# Patient Record
Sex: MALE | Race: OTHER | NOT HISPANIC OR LATINO | Employment: OTHER | URBAN - NONMETROPOLITAN AREA
[De-identification: names, ages, dates, MRNs, and addresses within clinical notes are randomized per-mention and may not be internally consistent; named-entity substitution may affect disease eponyms.]

---

## 2019-09-16 ENCOUNTER — TELEPHONE (OUTPATIENT)
Dept: CARDIOLOGY | Facility: PHYSICIAN GROUP | Age: 84
End: 2019-09-16

## 2019-09-16 NOTE — TELEPHONE ENCOUNTER
Padmini Killian at [facility] called and said they have no recent testing for the patient. She said last labs patient had done at their hospital was in 2016, and there are no cardiac records.

## 2019-09-16 NOTE — TELEPHONE ENCOUNTER
Left message for patient for recent labs and cardiac testing, us carotid scanned in. Also faxed request to [facility].

## 2020-03-08 ENCOUNTER — ANESTHESIA (OUTPATIENT)
Dept: SURGERY | Facility: MEDICAL CENTER | Age: 85
DRG: 663 | End: 2020-03-08
Payer: MEDICARE

## 2020-03-08 ENCOUNTER — HOSPITAL ENCOUNTER (OUTPATIENT)
Facility: MEDICAL CENTER | Age: 85
DRG: 663 | End: 2020-03-08
Payer: MEDICARE

## 2020-03-08 ENCOUNTER — HOSPITAL ENCOUNTER (INPATIENT)
Facility: MEDICAL CENTER | Age: 85
LOS: 5 days | DRG: 663 | End: 2020-03-13
Attending: FAMILY MEDICINE | Admitting: FAMILY MEDICINE
Payer: MEDICARE

## 2020-03-08 ENCOUNTER — ANESTHESIA EVENT (OUTPATIENT)
Dept: SURGERY | Facility: MEDICAL CENTER | Age: 85
DRG: 663 | End: 2020-03-08
Payer: MEDICARE

## 2020-03-08 DIAGNOSIS — I35.0 AORTIC STENOSIS: ICD-10-CM

## 2020-03-08 PROBLEM — D62 ACUTE BLOOD LOSS ANEMIA: Status: ACTIVE | Noted: 2020-03-08

## 2020-03-08 PROBLEM — R31.9 HEMATURIA: Status: ACTIVE | Noted: 2020-03-08

## 2020-03-08 LAB
ABO GROUP BLD: NORMAL
ANION GAP SERPL CALC-SCNC: 8 MMOL/L (ref 0–11.9)
BARCODED ABORH UBTYP: 6200
BARCODED ABORH UBTYP: 6200
BARCODED PRD CODE UBPRD: NORMAL
BARCODED PRD CODE UBPRD: NORMAL
BARCODED UNIT NUM UBUNT: NORMAL
BARCODED UNIT NUM UBUNT: NORMAL
BLD GP AB SCN SERPL QL: NORMAL
BUN SERPL-MCNC: 41 MG/DL (ref 8–22)
CALCIUM SERPL-MCNC: 7.8 MG/DL (ref 8.5–10.5)
CHLORIDE SERPL-SCNC: 108 MMOL/L (ref 96–112)
CO2 SERPL-SCNC: 19 MMOL/L (ref 20–33)
COMPONENT R 8504R: NORMAL
COMPONENT R 8504R: NORMAL
CREAT SERPL-MCNC: 1.07 MG/DL (ref 0.5–1.4)
EKG IMPRESSION: NORMAL
ERYTHROCYTE [DISTWIDTH] IN BLOOD BY AUTOMATED COUNT: 55.5 FL (ref 35.9–50)
GLUCOSE SERPL-MCNC: 116 MG/DL (ref 65–99)
HCT VFR BLD AUTO: 23.6 % (ref 42–52)
HGB BLD-MCNC: 7.9 G/DL (ref 14–18)
INR PPP: 1.14 (ref 0.87–1.13)
MCH RBC QN AUTO: 31.1 PG (ref 27–33)
MCHC RBC AUTO-ENTMCNC: 33.5 G/DL (ref 33.7–35.3)
MCV RBC AUTO: 92.9 FL (ref 81.4–97.8)
PLATELET # BLD AUTO: 171 K/UL (ref 164–446)
PMV BLD AUTO: 10.6 FL (ref 9–12.9)
POTASSIUM SERPL-SCNC: 4.2 MMOL/L (ref 3.6–5.5)
PRODUCT TYPE UPROD: NORMAL
PRODUCT TYPE UPROD: NORMAL
PROTHROMBIN TIME: 14.9 SEC (ref 12–14.6)
RBC # BLD AUTO: 2.54 M/UL (ref 4.7–6.1)
RH BLD: NORMAL
SODIUM SERPL-SCNC: 135 MMOL/L (ref 135–145)
UNIT STATUS USTAT: NORMAL
UNIT STATUS USTAT: NORMAL
WBC # BLD AUTO: 7.4 K/UL (ref 4.8–10.8)

## 2020-03-08 PROCEDURE — 86901 BLOOD TYPING SEROLOGIC RH(D): CPT

## 2020-03-08 PROCEDURE — 30233N1 TRANSFUSION OF NONAUTOLOGOUS RED BLOOD CELLS INTO PERIPHERAL VEIN, PERCUTANEOUS APPROACH: ICD-10-PCS | Performed by: UROLOGY

## 2020-03-08 PROCEDURE — A4338 INDWELLING CATHETER LATEX: HCPCS | Performed by: UROLOGY

## 2020-03-08 PROCEDURE — 160028 HCHG SURGERY MINUTES - 1ST 30 MINS LEVEL 3: Performed by: UROLOGY

## 2020-03-08 PROCEDURE — 36415 COLL VENOUS BLD VENIPUNCTURE: CPT

## 2020-03-08 PROCEDURE — 93010 ELECTROCARDIOGRAM REPORT: CPT | Performed by: INTERNAL MEDICINE

## 2020-03-08 PROCEDURE — 501329 HCHG SET, CYSTO IRRIG Y TUR: Performed by: UROLOGY

## 2020-03-08 PROCEDURE — 160035 HCHG PACU - 1ST 60 MINS PHASE I: Performed by: UROLOGY

## 2020-03-08 PROCEDURE — 99223 1ST HOSP IP/OBS HIGH 75: CPT | Mod: AI,25 | Performed by: INTERNAL MEDICINE

## 2020-03-08 PROCEDURE — 160048 HCHG OR STATISTICAL LEVEL 1-5: Performed by: UROLOGY

## 2020-03-08 PROCEDURE — 700101 HCHG RX REV CODE 250: Performed by: ANESTHESIOLOGY

## 2020-03-08 PROCEDURE — 0TCB8ZZ EXTIRPATION OF MATTER FROM BLADDER, VIA NATURAL OR ARTIFICIAL OPENING ENDOSCOPIC: ICD-10-PCS | Performed by: UROLOGY

## 2020-03-08 PROCEDURE — 85027 COMPLETE CBC AUTOMATED: CPT

## 2020-03-08 PROCEDURE — 700102 HCHG RX REV CODE 250 W/ 637 OVERRIDE(OP): Performed by: INTERNAL MEDICINE

## 2020-03-08 PROCEDURE — 85610 PROTHROMBIN TIME: CPT

## 2020-03-08 PROCEDURE — 700105 HCHG RX REV CODE 258: Performed by: INTERNAL MEDICINE

## 2020-03-08 PROCEDURE — A9270 NON-COVERED ITEM OR SERVICE: HCPCS | Performed by: INTERNAL MEDICINE

## 2020-03-08 PROCEDURE — 86900 BLOOD TYPING SEROLOGIC ABO: CPT

## 2020-03-08 PROCEDURE — 700111 HCHG RX REV CODE 636 W/ 250 OVERRIDE (IP): Performed by: ANESTHESIOLOGY

## 2020-03-08 PROCEDURE — 770020 HCHG ROOM/CARE - TELE (206)

## 2020-03-08 PROCEDURE — 80048 BASIC METABOLIC PNL TOTAL CA: CPT

## 2020-03-08 PROCEDURE — 86850 RBC ANTIBODY SCREEN: CPT

## 2020-03-08 PROCEDURE — 160009 HCHG ANES TIME/MIN: Performed by: UROLOGY

## 2020-03-08 PROCEDURE — 160002 HCHG RECOVERY MINUTES (STAT): Performed by: UROLOGY

## 2020-03-08 PROCEDURE — 99358 PROLONG SERVICE W/O CONTACT: CPT | Performed by: INTERNAL MEDICINE

## 2020-03-08 PROCEDURE — 0W3R8ZZ CONTROL BLEEDING IN GENITOURINARY TRACT, VIA NATURAL OR ARTIFICIAL OPENING ENDOSCOPIC: ICD-10-PCS | Performed by: UROLOGY

## 2020-03-08 PROCEDURE — 160039 HCHG SURGERY MINUTES - EA ADDL 1 MIN LEVEL 3: Performed by: UROLOGY

## 2020-03-08 PROCEDURE — 160036 HCHG PACU - EA ADDL 30 MINS PHASE I: Performed by: UROLOGY

## 2020-03-08 PROCEDURE — 700105 HCHG RX REV CODE 258: Performed by: ANESTHESIOLOGY

## 2020-03-08 PROCEDURE — 93005 ELECTROCARDIOGRAM TRACING: CPT | Performed by: FAMILY MEDICINE

## 2020-03-08 RX ORDER — HYDRALAZINE HYDROCHLORIDE 20 MG/ML
5 INJECTION INTRAMUSCULAR; INTRAVENOUS
Status: DISCONTINUED | OUTPATIENT
Start: 2020-03-08 | End: 2020-03-08 | Stop reason: HOSPADM

## 2020-03-08 RX ORDER — OXYCODONE HCL 5 MG/5 ML
10 SOLUTION, ORAL ORAL
Status: DISCONTINUED | OUTPATIENT
Start: 2020-03-08 | End: 2020-03-08 | Stop reason: HOSPADM

## 2020-03-08 RX ORDER — SODIUM CHLORIDE, SODIUM LACTATE, POTASSIUM CHLORIDE, CALCIUM CHLORIDE 600; 310; 30; 20 MG/100ML; MG/100ML; MG/100ML; MG/100ML
INJECTION, SOLUTION INTRAVENOUS CONTINUOUS
Status: DISCONTINUED | OUTPATIENT
Start: 2020-03-08 | End: 2020-03-08 | Stop reason: HOSPADM

## 2020-03-08 RX ORDER — CEFAZOLIN SODIUM 1 G/3ML
INJECTION, POWDER, FOR SOLUTION INTRAMUSCULAR; INTRAVENOUS PRN
Status: DISCONTINUED | OUTPATIENT
Start: 2020-03-08 | End: 2020-03-08 | Stop reason: SURG

## 2020-03-08 RX ORDER — LABETALOL HYDROCHLORIDE 5 MG/ML
5 INJECTION, SOLUTION INTRAVENOUS
Status: DISCONTINUED | OUTPATIENT
Start: 2020-03-08 | End: 2020-03-08 | Stop reason: HOSPADM

## 2020-03-08 RX ORDER — CARVEDILOL 25 MG/1
25 TABLET ORAL 2 TIMES DAILY WITH MEALS
Status: DISCONTINUED | OUTPATIENT
Start: 2020-03-08 | End: 2020-03-09

## 2020-03-08 RX ORDER — POLYETHYLENE GLYCOL 3350 17 G/17G
1 POWDER, FOR SOLUTION ORAL
Status: DISCONTINUED | OUTPATIENT
Start: 2020-03-08 | End: 2020-03-13 | Stop reason: HOSPADM

## 2020-03-08 RX ORDER — ONDANSETRON 2 MG/ML
4 INJECTION INTRAMUSCULAR; INTRAVENOUS EVERY 4 HOURS PRN
Status: DISCONTINUED | OUTPATIENT
Start: 2020-03-08 | End: 2020-03-13 | Stop reason: HOSPADM

## 2020-03-08 RX ORDER — DIPHENHYDRAMINE HYDROCHLORIDE 50 MG/ML
12.5 INJECTION INTRAMUSCULAR; INTRAVENOUS
Status: DISCONTINUED | OUTPATIENT
Start: 2020-03-08 | End: 2020-03-08 | Stop reason: HOSPADM

## 2020-03-08 RX ORDER — FINASTERIDE 5 MG/1
5 TABLET, FILM COATED ORAL DAILY
Status: DISCONTINUED | OUTPATIENT
Start: 2020-03-09 | End: 2020-03-13 | Stop reason: HOSPADM

## 2020-03-08 RX ORDER — ONDANSETRON 2 MG/ML
INJECTION INTRAMUSCULAR; INTRAVENOUS PRN
Status: DISCONTINUED | OUTPATIENT
Start: 2020-03-08 | End: 2020-03-08 | Stop reason: SURG

## 2020-03-08 RX ORDER — OXYCODONE HCL 5 MG/5 ML
5 SOLUTION, ORAL ORAL
Status: DISCONTINUED | OUTPATIENT
Start: 2020-03-08 | End: 2020-03-08 | Stop reason: HOSPADM

## 2020-03-08 RX ORDER — PHENYLEPHRINE HCL IN 0.9% NACL 0.5 MG/5ML
SYRINGE (ML) INTRAVENOUS PRN
Status: DISCONTINUED | OUTPATIENT
Start: 2020-03-08 | End: 2020-03-08 | Stop reason: SURG

## 2020-03-08 RX ORDER — ONDANSETRON 2 MG/ML
4 INJECTION INTRAMUSCULAR; INTRAVENOUS
Status: DISCONTINUED | OUTPATIENT
Start: 2020-03-08 | End: 2020-03-08 | Stop reason: HOSPADM

## 2020-03-08 RX ORDER — HALOPERIDOL 5 MG/ML
1 INJECTION INTRAMUSCULAR
Status: DISCONTINUED | OUTPATIENT
Start: 2020-03-08 | End: 2020-03-08 | Stop reason: HOSPADM

## 2020-03-08 RX ORDER — SODIUM CHLORIDE, SODIUM LACTATE, POTASSIUM CHLORIDE, CALCIUM CHLORIDE 600; 310; 30; 20 MG/100ML; MG/100ML; MG/100ML; MG/100ML
INJECTION, SOLUTION INTRAVENOUS
Status: DISCONTINUED | OUTPATIENT
Start: 2020-03-08 | End: 2020-03-08 | Stop reason: SURG

## 2020-03-08 RX ORDER — ONDANSETRON 4 MG/1
4 TABLET, ORALLY DISINTEGRATING ORAL EVERY 4 HOURS PRN
Status: DISCONTINUED | OUTPATIENT
Start: 2020-03-08 | End: 2020-03-13 | Stop reason: HOSPADM

## 2020-03-08 RX ORDER — AMOXICILLIN 250 MG
2 CAPSULE ORAL 2 TIMES DAILY
Status: DISCONTINUED | OUTPATIENT
Start: 2020-03-08 | End: 2020-03-13 | Stop reason: HOSPADM

## 2020-03-08 RX ORDER — LISINOPRIL 20 MG/1
40 TABLET ORAL
Status: DISCONTINUED | OUTPATIENT
Start: 2020-03-08 | End: 2020-03-08

## 2020-03-08 RX ORDER — BISACODYL 10 MG
10 SUPPOSITORY, RECTAL RECTAL
Status: DISCONTINUED | OUTPATIENT
Start: 2020-03-08 | End: 2020-03-13 | Stop reason: HOSPADM

## 2020-03-08 RX ORDER — LIDOCAINE HYDROCHLORIDE 20 MG/ML
INJECTION, SOLUTION EPIDURAL; INFILTRATION; INTRACAUDAL; PERINEURAL PRN
Status: DISCONTINUED | OUTPATIENT
Start: 2020-03-08 | End: 2020-03-08 | Stop reason: SURG

## 2020-03-08 RX ORDER — PRAVASTATIN SODIUM 20 MG
10 TABLET ORAL
Status: DISCONTINUED | OUTPATIENT
Start: 2020-03-08 | End: 2020-03-13 | Stop reason: HOSPADM

## 2020-03-08 RX ORDER — ACETAMINOPHEN 325 MG/1
650 TABLET ORAL EVERY 6 HOURS PRN
Status: DISCONTINUED | OUTPATIENT
Start: 2020-03-08 | End: 2020-03-13 | Stop reason: HOSPADM

## 2020-03-08 RX ORDER — SODIUM CHLORIDE 9 MG/ML
INJECTION, SOLUTION INTRAVENOUS CONTINUOUS
Status: DISCONTINUED | OUTPATIENT
Start: 2020-03-08 | End: 2020-03-13 | Stop reason: HOSPADM

## 2020-03-08 RX ORDER — TERAZOSIN 1 MG/1
1 CAPSULE ORAL
Status: DISCONTINUED | OUTPATIENT
Start: 2020-03-08 | End: 2020-03-13 | Stop reason: HOSPADM

## 2020-03-08 RX ORDER — AMLODIPINE BESYLATE 5 MG/1
5 TABLET ORAL
Status: DISCONTINUED | OUTPATIENT
Start: 2020-03-08 | End: 2020-03-08

## 2020-03-08 RX ADMIN — Medication 200 MCG: at 18:33

## 2020-03-08 RX ADMIN — Medication 200 MCG: at 18:34

## 2020-03-08 RX ADMIN — PROPOFOL 100 MG: 10 INJECTION, EMULSION INTRAVENOUS at 18:25

## 2020-03-08 RX ADMIN — SODIUM CHLORIDE, POTASSIUM CHLORIDE, SODIUM LACTATE AND CALCIUM CHLORIDE: 600; 310; 30; 20 INJECTION, SOLUTION INTRAVENOUS at 18:16

## 2020-03-08 RX ADMIN — FENTANYL CITRATE 25 MCG: 50 INJECTION, SOLUTION INTRAMUSCULAR; INTRAVENOUS at 19:12

## 2020-03-08 RX ADMIN — SENNOSIDES AND DOCUSATE SODIUM 2 TABLET: 8.6; 5 TABLET ORAL at 17:01

## 2020-03-08 RX ADMIN — Medication 100 MCG: at 18:31

## 2020-03-08 RX ADMIN — ONDANSETRON 4 MG: 2 INJECTION INTRAMUSCULAR; INTRAVENOUS at 19:07

## 2020-03-08 RX ADMIN — FENTANYL CITRATE 25 MCG: 50 INJECTION, SOLUTION INTRAMUSCULAR; INTRAVENOUS at 19:07

## 2020-03-08 RX ADMIN — Medication 100 MCG: at 18:25

## 2020-03-08 RX ADMIN — PRAVASTATIN SODIUM 10 MG: 20 TABLET ORAL at 22:24

## 2020-03-08 RX ADMIN — CEFAZOLIN 2 G: 330 INJECTION, POWDER, FOR SOLUTION INTRAMUSCULAR; INTRAVENOUS at 18:38

## 2020-03-08 RX ADMIN — CARVEDILOL 25 MG: 25 TABLET, FILM COATED ORAL at 17:01

## 2020-03-08 RX ADMIN — SODIUM CHLORIDE: 9 INJECTION, SOLUTION INTRAVENOUS at 17:01

## 2020-03-08 RX ADMIN — FENTANYL CITRATE 50 MCG: 50 INJECTION, SOLUTION INTRAMUSCULAR; INTRAVENOUS at 18:25

## 2020-03-08 RX ADMIN — EPHEDRINE SULFATE 20 MG: 50 INJECTION, SOLUTION INTRAVENOUS at 18:35

## 2020-03-08 RX ADMIN — LIDOCAINE HYDROCHLORIDE 80 MG: 20 INJECTION, SOLUTION EPIDURAL; INFILTRATION; INTRACAUDAL at 18:25

## 2020-03-08 ASSESSMENT — PATIENT HEALTH QUESTIONNAIRE - PHQ9
2. FEELING DOWN, DEPRESSED, IRRITABLE, OR HOPELESS: NOT AT ALL
SUM OF ALL RESPONSES TO PHQ9 QUESTIONS 1 AND 2: 0
1. LITTLE INTEREST OR PLEASURE IN DOING THINGS: NOT AT ALL

## 2020-03-08 ASSESSMENT — LIFESTYLE VARIABLES
HAVE PEOPLE ANNOYED YOU BY CRITICIZING YOUR DRINKING: NO
AVERAGE NUMBER OF DAYS PER WEEK YOU HAVE A DRINK CONTAINING ALCOHOL: 0
TOTAL SCORE: 0
ALCOHOL_USE: NO
DOES PATIENT WANT TO STOP DRINKING: CANNOT ASSESS
CONSUMPTION TOTAL: NEGATIVE
EVER FELT BAD OR GUILTY ABOUT YOUR DRINKING: NO
ON A TYPICAL DAY WHEN YOU DRINK ALCOHOL HOW MANY DRINKS DO YOU HAVE: 0
HAVE YOU EVER FELT YOU SHOULD CUT DOWN ON YOUR DRINKING: NO
EVER_SMOKED: NEVER
HOW MANY TIMES IN THE PAST YEAR HAVE YOU HAD 5 OR MORE DRINKS IN A DAY: 0
TOTAL SCORE: 0
EVER HAD A DRINK FIRST THING IN THE MORNING TO STEADY YOUR NERVES TO GET RID OF A HANGOVER: NO
TOTAL SCORE: 0

## 2020-03-08 ASSESSMENT — ENCOUNTER SYMPTOMS
CHILLS: 0
SEIZURES: 0
DEPRESSION: 0
NECK PAIN: 0
SHORTNESS OF BREATH: 0
EYE DISCHARGE: 0
EYE PAIN: 0
FLANK PAIN: 0
INSOMNIA: 0
HEARTBURN: 0
HEADACHES: 0
STRIDOR: 0
NAUSEA: 0
ABDOMINAL PAIN: 0
COUGH: 0
BACK PAIN: 0
EYE REDNESS: 0
DIARRHEA: 0
BRUISES/BLEEDS EASILY: 0
PALPITATIONS: 0
BLURRED VISION: 0
FEVER: 0
ORTHOPNEA: 0
FOCAL WEAKNESS: 0
VOMITING: 0
MYALGIAS: 0
DIZZINESS: 0
WEIGHT LOSS: 0
NERVOUS/ANXIOUS: 0
SPUTUM PRODUCTION: 0

## 2020-03-08 ASSESSMENT — PAIN SCALES - GENERAL: PAIN_LEVEL: 1

## 2020-03-08 NOTE — PROGRESS NOTES
Transfer from [facility], secondary to gross hematuria, anemia secondary to blood loss, requiring transfusion of 2 units PRBC.  Patient with previous cystoscopy done in January and February this year which were negative for bladder tumor.  Urology - [place] consulted on the case.

## 2020-03-08 NOTE — CONSULTS
[facility] [place] Consult/H&P Note    Primary Service: Hospitalis service  Attending: Gabo Horne M.D.  Patient's Name/MRN: Noris Patel, [id number]    Admit Date:3/8/2020  Today's Date: 3/8/2020   Length of stay:  LOS: 0 days   Room #: T714/01      Reason for consult/chief complaint: GH and anemia  ID/HPI: Noris Patel is a 84 y.o. male patient with past medical history of essential hypertension, BPH, dyslipidemia who presented 3/8/2020 with hematuria.  The patient has history of hematuria and had cystoscopy done recently by urologist in [place] which was normal.   He initially admitted to outside facility where he was found to have severe anemia and received 3 unit of blood transfusion over there.  He was transferred here for urology consult. He did have CT scan of the abdomenn showing Diffuse bladder wall thickening with a possible posterior bladder mass, atrophic left kidney with simple cyst about 5.5 cm in size, cholelithiasis without evidence of cholecystitis, colonic diverticulosis.       Past Medical History:   Past Medical History:   Diagnosis Date   • Cardiomyopathy, nonischemic 3/7/2013   • History of aortic valve replacement 3/7/2013   • History of hodgkin's lymphoma 3/7/2013   • History of peptic ulcer disease 3/7/2013   • HTN (hypertension) 3/7/2013   • Hyperlipemia 3/7/2013        Past Surgical History:   Past Surgical History:   Procedure Laterality Date   • AORTIC VALVE REPLACEMENT  4/12/2012    Performed by DEQUAN PIRES at [facility] ORS   • GASTRECTOMY      partial        Family History:   No family history on file.      Social History:   Social History     Tobacco Use   • Smoking status: Never Smoker   • Smokeless tobacco: Never Used   Substance Use Topics   • Alcohol use: Yes     Alcohol/week: 5.0 oz     Types: 10 drink(s) per week   • Drug use: Not on file      Social History     Social History Narrative   • Not on file        Allergies: he Food color red [fd&c red #40]; Food color  yellow [fd&c yellow #5 [place]]; Influenza vaccines; and Other food    Medications:   Medications Prior to Admission   Medication Sig Dispense Refill Last Dose   • amlodipine (NORVASC) 5 MG Tab Take 1 Tab by mouth every bedtime. 90 Tab 3    • terazosin (HYTRIN) 1 MG Cap Take 1 Cap by mouth every bedtime. 90 Cap 3    • carvedilol (COREG) 25 MG Tab Take 1 Tab by mouth 2 times a day, with meals. 180 Tab 3    • pravastatin (PRAVACHOL) 10 MG Tab Take 1 Tab by mouth every bedtime. 90 Tab 3    • lisinopril (PRINIVIL, ZESTRIL) 40 MG tablet Take 1 Tab by mouth every bedtime. 90 Tab 3    • aspirin EC (ECOTRIN) 81 MG TBEC Take 81 mg by mouth every day.   8/5/2016   • potassium chloride SA (K-DUR) 10 MEQ TBCR Take 1 Tab by mouth every day. Needs to be seen for further refills. Thank you 90 Tab 0 8/5/2016         Review of Systems  Review of Systems   Constitutional: Negative for chills and fever.   Eyes: Negative for blurred vision.   Respiratory: Negative for shortness of breath.    Cardiovascular: Negative for chest pain.   Gastrointestinal: Negative for abdominal pain, nausea and vomiting.   Genitourinary: Positive for frequency, hematuria and urgency. Negative for dysuria and flank pain.   Musculoskeletal: Negative for back pain.   Skin: Negative for rash.   Endo/Heme/Allergies: Does not bruise/bleed easily.   Psychiatric/Behavioral: Negative for depression.        Physical Exam  VITAL SIGNS: /66   Pulse 99   Temp 36.8 °C (98.2 °F)   Resp 18   Wt 69.3 kg (152 lb 12.5 oz)   SpO2 99%   BMI 20.16 kg/m²   Physical Exam   Constitutional: He is oriented to person, place, and time and well-developed, well-nourished, and in no distress. No distress.   HENT:   Head: Normocephalic and atraumatic.   Eyes: EOM are normal.   Neck: Neck supple.   Cardiovascular: Normal rate and regular rhythm.   Pulmonary/Chest: Effort normal. He has no wheezes.   Abdominal: Soft. He exhibits no distension. There is no abdominal  tenderness.   Genitourinary:    Penis normal.     Musculoskeletal:         General: No edema.   Neurological: He is alert and oriented to person, place, and time.   Skin: Skin is warm and dry.   Psychiatric: Affect normal.   Vitals reviewed.        Labs:  Recent Labs     03/08/20  1624   WBC 7.4   RBC 2.54*   HEMOGLOBIN 7.9*   HEMATOCRIT 23.6*   MCV 92.9   MCH 31.1   MCHC 33.5*   RDW 55.5*   PLATELETCT 171   MPV 10.6     Recent Labs     03/08/20  1624   SODIUM 135   POTASSIUM 4.2   CHLORIDE 108   CO2 19*   GLUCOSE 116*   BUN 41*   CREATININE 1.07   CALCIUM 7.8*     Recent Labs     03/08/20  1624   INR 1.14*     Glucose:  Recent Labs     03/08/20  1624   GLUCOSE 116*     Coags:  Recent Labs     03/08/20  1624   INR 1.14*         Assessment/Recommendation   84 y.o.M with GH and transfusion x3 and possible bladder mass vs more likely clot.  Risks discussed, but not limited to, were infection, sepsis, bleeding, bladder injury, need for secondary procedure, inability to resect all of tumor if present, injury to ureters, need for abreu post op, possible admission post operatively, and the cardiovascular and pulmonary complications of anesthesia/surgery including DVT, Mi, PE, and death      · Consent obatined   · Proceed as planned  · Admission hospitalist post op  · DC when CBI weaned post op       Elvin Floyd M.D.   [address]  [place] [id number]   [phone]

## 2020-03-08 NOTE — PROGRESS NOTES
Transfer Center:    Received inpt to Direct Admit transfer request from [facility] @ 6858  Sending Physician:  Dora   Specialist consulted:  [place]   Diagnosis:  Hematuria, anemia from blood loss   Patient accepted by:  Ozzie     Patient coming via:  ground  ETA:  TBD   Medical records from sending facility scanned into Media tab.  Nursing to notify Direct Admit On-Call hospitalist and Specialist when patient arrives. Please release the signed & held ADT on arrival.    Plan:  Transfer Center to assist if needed.

## 2020-03-08 NOTE — ASSESSMENT & PLAN NOTE
"\"Related to hematuria  -Status post 1 unit packed red blood cells and check every 12 CBC\"  Hb on the low side, rrequired transfusion 3/10 night  "

## 2020-03-08 NOTE — CARE PLAN
2 RN Skin Check    2 RN skin check complete.   Devices in place: SCDs.  Skin assessed under devices: yes.  Confirmed pressure ulcers found on: none.  New potential pressure ulcers noted on none. Wound consult placed No.  The following interventions in place Mepilex, pillows.    Redness but blanchable skin on left shin, coccyx, sacrum.  Psoriasis on right posterior back.

## 2020-03-08 NOTE — ASSESSMENT & PLAN NOTE
"\"History of recurrent hematuria  On Sevilla catheter, continue CBI and remains bloody  -Status post urological procedure fulguration and no clear bladder masses or prostate tumors  Transfuse as needed with target hemoglobin above 7\"  Outpatient follow up to his Urologist at [facility] for urolift procedure per Urology  Meanwhile while waiting for discharge to [facility] likely tomorrow, Urology recommends surgery if rebleeding otherwise they have signed off  "

## 2020-03-08 NOTE — H&P
Hospital Medicine History & Physical Note    Date of Service  3/8/2020    Primary Care Physician  Bradly Sotomayor M.D.    Consultants  Dr. Boyle    Code Status  full    Chief Complaint  hematuria    History of Presenting Illness  84 y.o. male with past medical history of essential hypertension, BPH, dyslipidemia who presented 3/8/2020 with hematuria.  The patient has history of hematuria and had cystoscopy done in the past.  He initially admitted to outside facility where he was found to have severe anemia and received 3 unit of blood transfusion over there.  He was transferred here for urology consult.  Dr. Boyle was consulted on his way here.  I review chart from outside facility  Labs WBC 6, hemoglobin 7.3, platelet 166  Sodium 135, potassium 4.1, chloride 111, CO2 18, glucose 133, BUN 44, creatinine 1.3  CT scan of the abdomen  Diffuse bladder wall thickening with a centimeter posterior bladder mass, atrophic left kidney with simple cyst about 5.5 cm in size, cholelithiasis without evidence of cholecystitis, colonic diverticulosis    Review of Systems  Review of Systems   Constitutional: Negative for chills, fever and weight loss.   HENT: Negative for congestion and nosebleeds.    Eyes: Negative for blurred vision, pain, discharge and redness.   Respiratory: Negative for cough, sputum production, shortness of breath and stridor.    Cardiovascular: Negative for chest pain, palpitations and orthopnea.   Gastrointestinal: Negative for abdominal pain, diarrhea, heartburn, nausea and vomiting.   Genitourinary: Positive for hematuria. Negative for dysuria, frequency and urgency.   Musculoskeletal: Negative for back pain, myalgias and neck pain.   Skin: Negative for itching and rash.   Neurological: Negative for dizziness, focal weakness, seizures and headaches.   Psychiatric/Behavioral: Negative for depression. The patient is not nervous/anxious and does not have insomnia.        Past Medical History   has a past  medical history of Cardiomyopathy, nonischemic (3/7/2013), History of aortic valve replacement (3/7/2013), History of hodgkin's lymphoma (3/7/2013), History of peptic ulcer disease (3/7/2013), HTN (hypertension) (3/7/2013), and Hyperlipemia (3/7/2013).    Surgical History   has a past surgical history that includes aortic valve replacement (4/12/2012) and gastrectomy.     Family History  Reviewed and no pertinent family history    Social History   reports that he has never smoked. He has never used smokeless tobacco. He reports current alcohol use of about 5.0 oz of alcohol per week.    Allergies  Allergies   Allergen Reactions   • Food Color Red [Fd&C Red #40]      dizziness   • Food Color Yellow [Fd&C Yellow #5 [place]]    • Other Food      Egg based vaccines, peanuts, tree nuts, sunflower seeds       Medications  Prior to Admission Medications   Prescriptions Last Dose Informant Patient Reported? Taking?   amlodipine (NORVASC) 5 MG Tab   No No   Sig: Take 1 Tab by mouth every bedtime.   aspirin EC (ECOTRIN) 81 MG TBEC   Yes No   Sig: Take 81 mg by mouth every day.   carvedilol (COREG) 25 MG Tab   No No   Sig: Take 1 Tab by mouth 2 times a day, with meals.   lisinopril (PRINIVIL, ZESTRIL) 40 MG tablet   No No   Sig: Take 1 Tab by mouth every bedtime.   potassium chloride SA (K-DUR) 10 MEQ TBCR   No No   Sig: Take 1 Tab by mouth every day. Needs to be seen for further refills. Thank you   pravastatin (PRAVACHOL) 10 MG Tab   No No   Sig: Take 1 Tab by mouth every bedtime.   terazosin (HYTRIN) 1 MG Cap   No No   Sig: Take 1 Cap by mouth every bedtime.      Facility-Administered Medications: None       Physical Exam  Temp:  [36.8 °C (98.2 °F)] 36.8 °C (98.2 °F)  Pulse:  [99] 99  Resp:  [18] 18  BP: (107)/(66) 107/66  SpO2:  [99 %] 99 %    Physical Exam  Vitals signs reviewed.   Constitutional:       General: He is not in acute distress.     Appearance: Normal appearance.   HENT:      Head: Normocephalic and  atraumatic.      Nose: No congestion or rhinorrhea.   Eyes:      Extraocular Movements: Extraocular movements intact.      Pupils: Pupils are equal, round, and reactive to light.   Neck:      Musculoskeletal: Normal range of motion and neck supple.   Cardiovascular:      Rate and Rhythm: Normal rate and regular rhythm.      Pulses: Normal pulses.   Pulmonary:      Effort: Pulmonary effort is normal. No respiratory distress.      Breath sounds: Normal breath sounds.   Abdominal:      General: Bowel sounds are normal. There is no distension.      Palpations: Abdomen is soft.      Tenderness: There is no abdominal tenderness.   Musculoskeletal:         General: No swelling or tenderness.   Skin:     General: Skin is warm.      Findings: No erythema.   Neurological:      General: No focal deficit present.      Mental Status: He is alert and oriented to person, place, and time.         Laboratory:          No results for input(s): ALTSGPT, ASTSGOT, ALKPHOSPHAT, TBILIRUBIN, DBILIRUBIN, GAMMAGT, AMYLASE, LIPASE, ALB, PREALBUMIN, GLUCOSE in the last 72 hours.      No results for input(s): NTPROBNP in the last 72 hours.      No results for input(s): TROPONINT in the last 72 hours.    Urinalysis:    No results found     Imaging:  No orders to display         Assessment/Plan:  I anticipate this patient will require at least two midnights for appropriate medical management, necessitating inpatient admission.    Hematuria- (present on admission)  Assessment & Plan  History of recurrent hematuria  On Sevilla catheter  Patient received 3 unit of blood at outside facility over the past few days  Urology consulted  Monitor CBC closely  Transfuse as needed with target hemoglobin above 7    Acute blood loss anemia- (present on admission)  Assessment & Plan  Related to hematuria  Plan as above    Essential hypertension- (present on admission)  Assessment & Plan  Continue outpatient meds    I spent a total of 30 minutes of non face to  face time performing additional research, reviewing old medical records, provided on going management by following up on labs, placing orders, discussing plan of care with other healthcare providers and nursing staff. Start time: 1500. End time: 1530     Billing code [id number]    VTE prophylaxis: scds

## 2020-03-09 LAB
ALBUMIN SERPL BCP-MCNC: 2.6 G/DL (ref 3.2–4.9)
ALBUMIN/GLOB SERPL: 1.6 G/DL
ALP SERPL-CCNC: 55 U/L (ref 30–99)
ALT SERPL-CCNC: 6 U/L (ref 2–50)
ANION GAP SERPL CALC-SCNC: 6 MMOL/L (ref 0–11.9)
AST SERPL-CCNC: 12 U/L (ref 12–45)
BILIRUB SERPL-MCNC: 0.5 MG/DL (ref 0.1–1.5)
BUN SERPL-MCNC: 35 MG/DL (ref 8–22)
CALCIUM SERPL-MCNC: 7.5 MG/DL (ref 8.5–10.5)
CHLORIDE SERPL-SCNC: 110 MMOL/L (ref 96–112)
CO2 SERPL-SCNC: 20 MMOL/L (ref 20–33)
CREAT SERPL-MCNC: 0.94 MG/DL (ref 0.5–1.4)
ERYTHROCYTE [DISTWIDTH] IN BLOOD BY AUTOMATED COUNT: 56 FL (ref 35.9–50)
GLOBULIN SER CALC-MCNC: 1.6 G/DL (ref 1.9–3.5)
GLUCOSE SERPL-MCNC: 97 MG/DL (ref 65–99)
HCT VFR BLD AUTO: 19.8 % (ref 42–52)
HCT VFR BLD AUTO: 21.3 % (ref 42–52)
HCT VFR BLD AUTO: 23.9 % (ref 42–52)
HGB BLD-MCNC: 6.4 G/DL (ref 14–18)
HGB BLD-MCNC: 6.5 G/DL (ref 14–18)
HGB BLD-MCNC: 7.1 G/DL (ref 14–18)
HGB BLD-MCNC: 7.8 G/DL (ref 14–18)
MCH RBC QN AUTO: 30.6 PG (ref 27–33)
MCHC RBC AUTO-ENTMCNC: 32.3 G/DL (ref 33.7–35.3)
MCV RBC AUTO: 94.7 FL (ref 81.4–97.8)
PLATELET # BLD AUTO: 141 K/UL (ref 164–446)
PMV BLD AUTO: 10.3 FL (ref 9–12.9)
POTASSIUM SERPL-SCNC: 4.1 MMOL/L (ref 3.6–5.5)
PROT SERPL-MCNC: 4.2 G/DL (ref 6–8.2)
RBC # BLD AUTO: 2.09 M/UL (ref 4.7–6.1)
SODIUM SERPL-SCNC: 136 MMOL/L (ref 135–145)
WBC # BLD AUTO: 6.7 K/UL (ref 4.8–10.8)

## 2020-03-09 PROCEDURE — 700105 HCHG RX REV CODE 258: Performed by: INTERNAL MEDICINE

## 2020-03-09 PROCEDURE — 85018 HEMOGLOBIN: CPT

## 2020-03-09 PROCEDURE — 700102 HCHG RX REV CODE 250 W/ 637 OVERRIDE(OP): Performed by: UROLOGY

## 2020-03-09 PROCEDURE — 36430 TRANSFUSION BLD/BLD COMPNT: CPT

## 2020-03-09 PROCEDURE — 36415 COLL VENOUS BLD VENIPUNCTURE: CPT

## 2020-03-09 PROCEDURE — A9270 NON-COVERED ITEM OR SERVICE: HCPCS | Performed by: UROLOGY

## 2020-03-09 PROCEDURE — 700102 HCHG RX REV CODE 250 W/ 637 OVERRIDE(OP): Performed by: INTERNAL MEDICINE

## 2020-03-09 PROCEDURE — 700105 HCHG RX REV CODE 258: Performed by: HOSPITALIST

## 2020-03-09 PROCEDURE — 0T9B80Z DRAINAGE OF BLADDER WITH DRAINAGE DEVICE, VIA NATURAL OR ARTIFICIAL OPENING ENDOSCOPIC: ICD-10-PCS | Performed by: INTERNAL MEDICINE

## 2020-03-09 PROCEDURE — 85014 HEMATOCRIT: CPT

## 2020-03-09 PROCEDURE — P9016 RBC LEUKOCYTES REDUCED: HCPCS

## 2020-03-09 PROCEDURE — 80053 COMPREHEN METABOLIC PANEL: CPT

## 2020-03-09 PROCEDURE — 86923 COMPATIBILITY TEST ELECTRIC: CPT

## 2020-03-09 PROCEDURE — 770020 HCHG ROOM/CARE - TELE (206)

## 2020-03-09 PROCEDURE — 99233 SBSQ HOSP IP/OBS HIGH 50: CPT | Performed by: INTERNAL MEDICINE

## 2020-03-09 PROCEDURE — 85027 COMPLETE CBC AUTOMATED: CPT

## 2020-03-09 PROCEDURE — A9270 NON-COVERED ITEM OR SERVICE: HCPCS | Performed by: INTERNAL MEDICINE

## 2020-03-09 RX ORDER — SODIUM CHLORIDE 9 MG/ML
500 INJECTION, SOLUTION INTRAVENOUS CONTINUOUS
Status: DISCONTINUED | OUTPATIENT
Start: 2020-03-09 | End: 2020-03-09

## 2020-03-09 RX ORDER — SODIUM CHLORIDE 9 MG/ML
INJECTION, SOLUTION INTRAVENOUS CONTINUOUS
Status: DISCONTINUED | OUTPATIENT
Start: 2020-03-09 | End: 2020-03-09

## 2020-03-09 RX ADMIN — FINASTERIDE 5 MG: 5 TABLET, FILM COATED ORAL at 06:20

## 2020-03-09 RX ADMIN — SODIUM CHLORIDE 500 ML: 9 INJECTION, SOLUTION INTRAVENOUS at 01:05

## 2020-03-09 RX ADMIN — SODIUM CHLORIDE: 9 INJECTION, SOLUTION INTRAVENOUS at 16:52

## 2020-03-09 RX ADMIN — TERAZOSIN HYDROCHLORIDE 1 MG: 1 CAPSULE ORAL at 23:14

## 2020-03-09 RX ADMIN — PRAVASTATIN SODIUM 10 MG: 20 TABLET ORAL at 23:14

## 2020-03-09 RX ADMIN — SODIUM CHLORIDE: 9 INJECTION, SOLUTION INTRAVENOUS at 03:55

## 2020-03-09 ASSESSMENT — COGNITIVE AND FUNCTIONAL STATUS - GENERAL
MOVING TO AND FROM BED TO CHAIR: A LITTLE
DAILY ACTIVITIY SCORE: 24
WALKING IN HOSPITAL ROOM: A LITTLE
STANDING UP FROM CHAIR USING ARMS: A LITTLE
MOVING FROM LYING ON BACK TO SITTING ON SIDE OF FLAT BED: A LITTLE
SUGGESTED CMS G CODE MODIFIER DAILY ACTIVITY: CH
SUGGESTED CMS G CODE MODIFIER MOBILITY: CK
MOBILITY SCORE: 19
CLIMB 3 TO 5 STEPS WITH RAILING: A LITTLE

## 2020-03-09 ASSESSMENT — ENCOUNTER SYMPTOMS
MYALGIAS: 0
ABDOMINAL PAIN: 0
SHORTNESS OF BREATH: 0
VOMITING: 0
FEVER: 0
NAUSEA: 0
CHILLS: 0

## 2020-03-09 ASSESSMENT — PATIENT HEALTH QUESTIONNAIRE - PHQ9
2. FEELING DOWN, DEPRESSED, IRRITABLE, OR HOPELESS: NOT AT ALL
1. LITTLE INTEREST OR PLEASURE IN DOING THINGS: NOT AT ALL
SUM OF ALL RESPONSES TO PHQ9 QUESTIONS 1 AND 2: 0

## 2020-03-09 ASSESSMENT — FIBROSIS 4 INDEX: FIB4 SCORE: 2.92

## 2020-03-09 NOTE — ANESTHESIA PREPROCEDURE EVALUATION
Hematuria with CT showing mass in the bladder. Pt received 3u PRBCs at outside facility. Hg now stable at 7.9. H/o AVR. Denies any CP/SOB/syncope.    Relevant Problems   NEURO   (+) History of Hodgkin's lymphoma   (+) History of peptic ulcer disease      CARDIAC   (+) Essential hypertension   (+) PAD (peripheral artery disease)       Physical Exam    Airway   Mallampati: II  TM distance: >3 FB  Neck ROM: full       Cardiovascular - normal exam  Rhythm: regular  Rate: normal  (-) murmur     Dental - normal exam         Pulmonary - normal exam  Breath sounds clear to auscultation     Abdominal    Neurological - normal exam                 Anesthesia Plan    ASA 3 (Cardiomyopathy, AVR)   ASA physical status 3 criteria: other (comment)    Plan - general       Airway plan will be LMA        Induction: intravenous    Postoperative Plan: Postoperative administration of opioids is intended.    Pertinent diagnostic labs and testing reviewed    Informed Consent:    Anesthetic plan and risks discussed with patient.    Use of blood products discussed with: patient whom consented to blood products.

## 2020-03-09 NOTE — PROGRESS NOTES
Pt complaining of severe abd pain, abd distended, CBI noted to not be putting out as much as had been all through night. Paged on call hospitalist Dr. Beaulieu, stated to try irrigating CBI. Irrigated CBI, minimal clots came out, irrigated 900mL out of CBI, reconnected and started flowing at steady rate. Will continue to monitor and update day team.

## 2020-03-09 NOTE — PROGRESS NOTES
Paged on call hospitalist regarding drop in blood pressure. Dr. Gunter returned page, new orders placed.

## 2020-03-09 NOTE — PROGRESS NOTES
02:11 Hospitalist notified of increasing drop in pt BP. Pt treated as ordered, this RN will continue to monitor.

## 2020-03-09 NOTE — ANESTHESIA TIME REPORT
Anesthesia Start and Stop Event Times     Date Time Event    3/8/2020 1803 Ready for Procedure     1814 Anesthesia Start     1923 Anesthesia Stop        Responsible Staff  03/08/20    Name Role Begin End    Maximo Calderon M.D. Anesth 1814 1923        Preop Diagnosis (Free Text):  Pre-op Diagnosis     HEMATURIA        Preop Diagnosis (Codes):    Post op Diagnosis  Hematuria      Premium Reason  E. Weekend    Comments:

## 2020-03-09 NOTE — PROGRESS NOTES
Hospital Medicine Daily Progress Note    Date of Service  3/9/2020    Chief Complaint  84 y.o. male admitted 3/8/2020 with gross hematuria and anemia    Hospital Course    See below      Interval Problem Update  Gross hematuria-eber with CBI in place with intermittent Mikel-Aid colored urine and had no abdominal pain this morning.    Anemia-1 unit of packed red blood cells this morning    Consultants/Specialty  Urology    Code Status  Full code full care    Disposition  Telemetry    Review of Systems  Review of Systems   Constitutional: Negative for chills and fever.   Respiratory: Negative for shortness of breath.    Gastrointestinal: Negative for abdominal pain, nausea and vomiting.   Genitourinary: Positive for hematuria. Negative for dysuria and urgency.   Musculoskeletal: Negative for joint pain and myalgias.   All other systems reviewed and are negative.       Physical Exam  Temp:  [36.1 °C (97 °F)-36.8 °C (98.2 °F)] 36.4 °C (97.6 °F)  Pulse:  [75-99] 80  Resp:  [14-20] 17  BP: ()/(41-89) 88/47  SpO2:  [92 %-100 %] 99 %    Physical Exam  Vitals signs and nursing note reviewed.   Constitutional:       General: He is not in acute distress.     Appearance: He is well-developed.   HENT:      Head: Normocephalic and atraumatic.      Mouth/Throat:      Pharynx: No oropharyngeal exudate.   Eyes:      General: No scleral icterus.     Pupils: Pupils are equal, round, and reactive to light.   Neck:      Musculoskeletal: Normal range of motion and neck supple.      Thyroid: No thyromegaly.   Cardiovascular:      Rate and Rhythm: Normal rate and regular rhythm.      Heart sounds: Normal heart sounds. No murmur.   Pulmonary:      Effort: Pulmonary effort is normal. No respiratory distress.      Breath sounds: Normal breath sounds. No wheezing.   Abdominal:      General: Bowel sounds are normal. There is no distension.      Palpations: Abdomen is soft.      Tenderness: There is no abdominal tenderness.    Genitourinary:     Penis: Normal.       Comments: CBI in place with gross blood-tinged urine  Musculoskeletal: Normal range of motion.         General: No tenderness.   Skin:     General: Skin is warm and dry.      Findings: No rash.   Neurological:      Mental Status: He is alert and oriented to person, place, and time.      Cranial Nerves: No cranial nerve deficit.         Fluids    Intake/Output Summary (Last 24 hours) at 3/9/2020 1113  Last data filed at 3/9/2020 0906  Gross per 24 hour   Intake 1554.58 ml   Output 04970 ml   Net -35637.42 ml       Laboratory  Recent Labs     03/08/20  1624 03/09/20  0253 03/09/20  0808   WBC 7.4 6.7  --    RBC 2.54* 2.09*  --    HEMOGLOBIN 7.9* 6.4*  6.5* 7.8*   HEMATOCRIT 23.6* 19.8* 23.9*   MCV 92.9 94.7  --    MCH 31.1 30.6  --    MCHC 33.5* 32.3*  --    RDW 55.5* 56.0*  --    PLATELETCT 171 141*  --    MPV 10.6 10.3  --      Recent Labs     03/08/20  1624 03/09/20  0253   SODIUM 135 136   POTASSIUM 4.2 4.1   CHLORIDE 108 110   CO2 19* 20   GLUCOSE 116* 97   BUN 41* 35*   CREATININE 1.07 0.94   CALCIUM 7.8* 7.5*     Recent Labs     03/08/20  1624   INR 1.14*               Imaging  No orders to display        Assessment/Plan  Hematuria- (present on admission)  Assessment & Plan  History of recurrent hematuria  On Sevilla catheter, continue CBI and remains bloody  -Status post urological procedure fulguration and no clear bladder masses or prostate tumors  Transfuse as needed with target hemoglobin above 7    Acute blood loss anemia- (present on admission)  Assessment & Plan  Related to hematuria  -Status post 1 unit packed red blood cells and check every 12 CBC    Essential hypertension- (present on admission)  Assessment & Plan  -hold outpatient coreg since BP's remain low  -continue hytrin given prostate issues       VTE prophylaxis: SCDs

## 2020-03-09 NOTE — ANESTHESIA QCDR
2019 [facility] Clinical Data Registry (for Quality Improvement)     Postoperative nausea/vomiting risk protocol (Adult = 18 yrs and Pediatric 3-17 yrs)- (430 and 463)  General inhalation anesthetic (NOT TIVA) with PONV risk factors: No  Provision of anti-emetic therapy with at least 2 different classes of agents: N/A  Patient DID NOT receive anti-emetic therapy and reason is documented in Medical Record: N/A    Multimodal Pain Management- (477)  Non-emergent surgery AND patient age >= 18: Yes  Use of Multimodal Pain Management, two or more drugs and/or interventions, NOT including systemic opioids: Yes  Exception: Documented allergy to multiple classes of analgesics: N/A    Smoking Abstinence (404)  Patient is current smoker (cigarette, pipe, e-cig, marijuanna): No  Elective Surgery:   Abstinence instructions provided prior to day of surgery:   Patient abstained from smoking on day of surgery:     Pre-Op Beta-Blocker in Isolated CABG (44)  Isolated CABG AND patient age >= 18: No  Beta-blocker admin within 24 hours of surgical incision:   Exception:of medical reason(s) for not administering beta blocker within 24 hours prior to surgical incision (e.g., not  indicated,other medical reason):     PACU assessment of acute postoperative pain prior to Anesthesia Care End- Applies to Patients Age = 18- (ABG7)  Initial PACU pain score is which of the following: < 7/10  Patient unable to report pain score: N/A    Post-anesthetic transfer of care checklist/protocol to PACU/ICU- (426 and 427)  Upon conclusion of case, patient transferred to which of the following locations: PACU/Non-ICU  Use of transfer checklist/protocol: Yes  Exclusion: Service Performed in Patient Hospital Room (and thus did not require transfer): N/A  Unplanned admission to ICU related to anesthesia service up through end of PACU care- (MD51)  Unplanned admission to ICU (not initially anticipated at anesthesia start time): No

## 2020-03-09 NOTE — PROGRESS NOTES
Paged on call hospitalist regarding pts hgb of 6.5. Dr. Beaulieu returned page, new orders to transfuse 1 unit RBC placed.

## 2020-03-09 NOTE — OP REPORT
[facility] Operative Report    Pre-operative Diagnosis: 1. Gross hematuria  2. Urinary clot retention  3. BPH   Post-operative Diagnosis: Same as above   Procedure: 1. Cystoscopy and evacuation of clot with fulguration of bleeder   Surgeon Elvin Floyd M.D.    Assistant: None   Anesthesia: General, LMA  Anesthesiologist: Maximo Calderon M.D.   Estimated Blood Loss: Minimal   IV fluids <1L crystalloid   Specimens: None   Drains: 1. 20F 3 way abreu with CBI running and 10cc in balloon   Complications: None   Condition: Stable, procedure well tolerated    Disposition:  PACU, admit hospitalist, wean CBI, TOV in AM if clear with close PVR checks, can DC and f/u with urologist in [place] (has urolift scheduled). Start finasteride.   Findings: 1.  Coapting lateral lobes of prostate, obstructive in appearance.  Mild median lobe enlargement.  Proximately 600 cc of clot within the bladder which was irrigated and removed with both a like and Rosalio syringe.  No bladder tumor.  Did have some bleeding from near prostatic bladder neck and median lobe of prostate.  This was adequately fulgurated with clear urine at end of case.     Indications for Procedure:  This is a 84 y.o. male with h/o gross hemturia and clot retention which was refractory to CBI therapy.  The risks, benefits, treatment options, potential complications and expected outcomes were discussed with the patient. The patient concurred with the proposed plan, giving informed consent for cystoscopy, evacuation of clot, and possible fulguration.  Risks discussed, but not limited to, were infection, sepsis, hyponatremia, bleeding, bladder injury, need for secondary procedure, urethral stricture, injury to ureters, need for abreu post op, and the cardiovascular and pulmonary complications of anesthesia/surgery including DVT, Mi, PE, and death     Procedure in Detail:  Ancef was administered prior to the start of the procedure for antimicrobial prophylaxis.  Sequential compression devices were placed for deep venous thrombosis prophylaxis. Then, he was placed supine after induction of a general anesthetic, and both legs were placed in Ismael stirrups. The perineal area was prepped and draped in a sterile fashion. A 22 Slovenian cystoscope was inserted into the urethra and the bladder was emptied and then distended with sterile saline. Cystoscopy identified a very large clot within the bladder and obstructive prostate with coapting lateral lobes and mild median lobe enlargement..  The clot was then evacuated using the [employer] evacuator and Rosalio syringe until all clot was removed. Repeat cystoscopy after all clot was evacuated showed no tumor a urachal diverticulum and mildly trabeculated bladder with prostate as described previously.  I did note some mild bleeding from the bladder neck and median lobe of the prostate.  This was fulgurated using the Bugbee electrode.  After I felt that adequate fulguration and hemostasis had been achieved his three-way Sevilla catheter was replaced with a 20 F three-way Sevilla catheter with 10 mL in the balloon and placed back on CBI. The patient was awoken from anesthesia and brought to recovery in satisfactory condition.         Elvin Floyd MD  [facility] [address]  [place] [id number]  [phone]

## 2020-03-09 NOTE — DISCHARGE PLANNING
Care Transition Team Assessment        Anticipated Discharge Disposition: SNF    Action: RN CM assessed pt at bedside and verified facesheet demographics.  Pt reports he was at [facility] ([facility]) in [place] and prior to that was living alone in a single [place] home in [place]. Pt was using a walker and wheelchair while at the SNF but states did not require any DME prior to this. Pt does not have Part D prescription drug coverage, uses [facility] in [place] or [facility]. Pt's PCP is Bradly Sotomayor. Pt anticipates returning to [facility] when medically cleared.     Barriers to Discharge: Medical clearance pending. PT OT evaluation pending.    Plan: Await PT OT evaluation & medical clearance for discharge back to [facility].     Information Source  Orientation : Oriented x 4  Information Given By: Patient  Who is responsible for making decisions for patient? : Patient    Readmission Evaluation  Is this a readmission?: No    Elopement Risk  Legal Hold: No  Ambulatory or Self Mobile in Wheelchair: No-Not an Elopement Risk  Elopement Risk: Not at Risk for Elopement    Interdisciplinary Discharge Planning  Primary Care Physician: Bradly Sotomayor MD  Lives with - Patient's Self Care Capacity: Alone and Able to Care For Self  Patient or legal guardian wants to designate a caregiver (see row info): No  Housing / Facility: [address] [facility]  Name of Care Facility: [facility]  Able to Return to Previous ADL's: Future Time w/Therapy  Mobility Issues: Yes  Prior Services: Other (Comments)(Pt was at [facility] prior to this admission)  Patient Expects to be Discharged to:: SNF-[facility]  Assistance Needed: Yes  Durable Medical Equipment: Walker, Other - Specify(Wheelchair)    Discharge Preparedness  What is your plan after discharge?: Skilled nursing facility  What are your discharge supports?: (None)  Prior Functional Level: Ambulatory, Uses Walker, Uses Wheelchair, Needs  Assist with Medication Management, Needs Assist with Activities of Daily Living  Difficulity with ADLs: Walking    Functional Assesment  Prior Functional Level: Ambulatory, Uses Walker, Uses Wheelchair, Needs Assist with Medication Management, Needs Assist with Activities of Daily Living    Finances  Financial Barriers to Discharge: No  Prescription Coverage: No  Prescription Coverage Comments: No Part D    Vision / Hearing Impairment  Vision Impairment : No  Right Eye Vision: Wears Glasses, Impaired  Left Eye Vision: Wears Glasses, Impaired  Hearing Impairment : No         Advance Directive  Advance Directive?: DPOA for Health Care  Durable Power of  Name and Contact : Claudia Haynes [phone]    Domestic Abuse  Have you ever been the victim of abuse or violence?: No  Physical Abuse or Sexual Abuse: No  Verbal Abuse or Emotional Abuse: No  Possible Abuse Reported to:: Not Applicable         Discharge Risks or Barriers  Discharge risks or barriers?: Post-acute placement / services  Patient risk factors: Lives alone and no community support, Vulnerable adult    Anticipated Discharge Information  Anticipated discharge disposition: SNF  Discharge Address: [address]  Discharge Contact Phone Number: [phone]

## 2020-03-09 NOTE — ANESTHESIA PROCEDURE NOTES
Airway  Date/Time: 3/8/2020 6:27 PM  Performed by: Maximo Calderon M.D.  Authorized by: Maximo Calderon M.D.     Location:  OR  Urgency:  Elective  Indications for Airway Management:  Anesthesia  Spontaneous Ventilation: absent    Sedation Level:  Deep  Preoxygenated: Yes    Mask Difficulty Assessment:  1 - vent by mask  Final Airway Type:  Supraglottic airway  Final Supraglottic Airway:  Standard LMA  SGA Size:  4  Number of Attempts at Approach:  1

## 2020-03-09 NOTE — PROGRESS NOTES
"Urology Progress Note    Post op Day # 1Cystoscopy and evacuation of clot with fulguration of bleeder due to gross hematuria, urinary clot retention, BPH    Interval Events: pt experienced sever bladder pain early this am and says RN removed clot - and now he feels much better without any other episodes. CBI in place and pt tolerating    S:  Constitutional: Negative for chills and fever.   Respiratory: Negative for shortness of breath.    Gastrointestinal: Negative for abdominal pain, nausea and vomiting.   Genitourinary: Positive for hematuria. Negative for dysuria and urgency.   Musculoskeletal: Negative for joint pain and myalgias.   All other systems reviewed and are negative.          O:   BP (!) 88/47   Pulse 80   Temp 36.4 °C (97.6 °F) (Temporal)   Resp 17   Ht 1.854 m (6' 1\")   Wt 69.3 kg (152 lb 12.5 oz)   SpO2 99%   Recent Labs     03/08/20  1624 03/09/20  0253   SODIUM 135 136   POTASSIUM 4.2 4.1   CHLORIDE 108 110   CO2 19* 20   GLUCOSE 116* 97   BUN 41* 35*   CREATININE 1.07 0.94   CALCIUM 7.8* 7.5*     Recent Labs     03/08/20  1624 03/09/20  0253 03/09/20  0808   WBC 7.4 6.7  --    RBC 2.54* 2.09*  --    HEMOGLOBIN 7.9* 6.4*  6.5* 7.8*   HEMATOCRIT 23.6* 19.8* 23.9*   MCV 92.9 94.7  --    MCH 31.1 30.6  --    MCHC 33.5* 32.3*  --    RDW 55.5* 56.0*  --    PLATELETCT 171 141*  --    MPV 10.6 10.3  --          Intake/Output Summary (Last 24 hours) at 3/9/2020 1255  Last data filed at 3/9/2020 1045  Gross per 24 hour   Intake 1554.58 ml   Output 17032 ml   Net -49088.42 ml       Exam:  Abdomen soft, benign.    Urine:bloody clear -appears as  red  david-aide in color no clots. CBI infusing medium rate    A/P:    Active Hospital Problems    Diagnosis   • Hematuria [R31.9]     Priority: High   • Acute blood loss anemia [D62]     Priority: Medium   • Essential hypertension [I10]       Stable.   Ambulate, IS.  Initial plan was to have void trail this am however pt wishes to keep abreu catheter.  He says " that he has had a abreu cath for months and states that he feels that he will not be able to empty well and result in retention.  For now we will keep CBI titrate down. Pt okay to keep cath until he has Urolift done by his Urologist in [place].  Pt to remain on Finasteride. Upon discharge.   Likely sign off tomorrow if pt doing well and pt remains without clots.      Plan of care discussed and directed by Everett Mosquera, JC.P.R.N.

## 2020-03-09 NOTE — ANESTHESIA POSTPROCEDURE EVALUATION
Patient: Noris Patel    Procedure Summary     Date:  03/08/20 Room / Location:  [place] [address] / SURGERY [facility]    Anesthesia Start:  1814 Anesthesia Stop:  1923    Procedure:  CYSTOSCOPY CLOT EVAC (N/A Bladder) Diagnosis:  (HEMATURIA)    Surgeon:  Elvin Floyd M.D. Responsible Provider:  Maximo Calderon M.D.    Anesthesia Type:  general ASA Status:  3          Final Anesthesia Type: general  Last vitals  BP   Blood Pressure : (!) 91/48(RN notified)    Temp   36.6 °C (97.8 °F)    Pulse   Pulse: 82   Resp   16    SpO2   99 %      Anesthesia Post Evaluation    Patient location during evaluation: PACU  Patient participation: complete - patient participated  Level of consciousness: awake and alert  Pain score: 1    Airway patency: patent  Anesthetic complications: no  Cardiovascular status: hemodynamically stable  Respiratory status: acceptable  Hydration status: euvolemic    PONV: none           Nurse Pain Score: 0 (NPRS)

## 2020-03-09 NOTE — PROGRESS NOTES
Patient arrived to floor with PACU RN. Vitals signs stable, CVI in place, AAOx4 and not reports of pain at this time. Pt oriented to room and call bell is in place.

## 2020-03-09 NOTE — OR NURSING
Pt VSS. Pt denies nausea or pain. Pt alert and oriented x4 but states he feels like he is dreaming. Pt out of OR with CBI running. CBI bags from OR empty and abreu empty. New bags hung and abreu secured with stat lock. Small amount of bleeding noted at urethra head, site cleaned. Urine in abreu bag clear and pink tinged with small blood clots.

## 2020-03-10 LAB
ANION GAP SERPL CALC-SCNC: 3 MMOL/L (ref 0–11.9)
BASOPHILS # BLD AUTO: 0.3 % (ref 0–1.8)
BASOPHILS # BLD: 0.02 K/UL (ref 0–0.12)
BUN SERPL-MCNC: 19 MG/DL (ref 8–22)
CALCIUM SERPL-MCNC: 7.9 MG/DL (ref 8.5–10.5)
CHLORIDE SERPL-SCNC: 112 MMOL/L (ref 96–112)
CO2 SERPL-SCNC: 23 MMOL/L (ref 20–33)
CREAT SERPL-MCNC: 0.81 MG/DL (ref 0.5–1.4)
EOSINOPHIL # BLD AUTO: 0.18 K/UL (ref 0–0.51)
EOSINOPHIL NFR BLD: 2.6 % (ref 0–6.9)
ERYTHROCYTE [DISTWIDTH] IN BLOOD BY AUTOMATED COUNT: 59.4 FL (ref 35.9–50)
GLUCOSE SERPL-MCNC: 99 MG/DL (ref 65–99)
HCT VFR BLD AUTO: 20.1 % (ref 42–52)
HCT VFR BLD AUTO: 22.3 % (ref 42–52)
HCT VFR BLD AUTO: 22.8 % (ref 42–52)
HGB BLD-MCNC: 6.6 G/DL (ref 14–18)
HGB BLD-MCNC: 7.1 G/DL (ref 14–18)
HGB BLD-MCNC: 7.2 G/DL (ref 14–18)
IMM GRANULOCYTES # BLD AUTO: 0.16 K/UL (ref 0–0.11)
IMM GRANULOCYTES NFR BLD AUTO: 2.4 % (ref 0–0.9)
LYMPHOCYTES # BLD AUTO: 1.04 K/UL (ref 1–4.8)
LYMPHOCYTES NFR BLD: 15.3 % (ref 22–41)
MCH RBC QN AUTO: 30.2 PG (ref 27–33)
MCHC RBC AUTO-ENTMCNC: 31.8 G/DL (ref 33.7–35.3)
MCV RBC AUTO: 94.9 FL (ref 81.4–97.8)
MONOCYTES # BLD AUTO: 0.62 K/UL (ref 0–0.85)
MONOCYTES NFR BLD AUTO: 9.1 % (ref 0–13.4)
NEUTROPHILS # BLD AUTO: 4.78 K/UL (ref 1.82–7.42)
NEUTROPHILS NFR BLD: 70.3 % (ref 44–72)
NRBC # BLD AUTO: 0.02 K/UL
NRBC BLD-RTO: 0.3 /100 WBC
PLATELET # BLD AUTO: 166 K/UL (ref 164–446)
PMV BLD AUTO: 10.2 FL (ref 9–12.9)
POTASSIUM SERPL-SCNC: 4.2 MMOL/L (ref 3.6–5.5)
RBC # BLD AUTO: 2.35 M/UL (ref 4.7–6.1)
SODIUM SERPL-SCNC: 138 MMOL/L (ref 135–145)
WBC # BLD AUTO: 6.8 K/UL (ref 4.8–10.8)

## 2020-03-10 PROCEDURE — 700105 HCHG RX REV CODE 258: Performed by: INTERNAL MEDICINE

## 2020-03-10 PROCEDURE — 99233 SBSQ HOSP IP/OBS HIGH 50: CPT | Performed by: INTERNAL MEDICINE

## 2020-03-10 PROCEDURE — 86923 COMPATIBILITY TEST ELECTRIC: CPT

## 2020-03-10 PROCEDURE — 700102 HCHG RX REV CODE 250 W/ 637 OVERRIDE(OP): Performed by: INTERNAL MEDICINE

## 2020-03-10 PROCEDURE — 36415 COLL VENOUS BLD VENIPUNCTURE: CPT

## 2020-03-10 PROCEDURE — 85018 HEMOGLOBIN: CPT | Mod: 91

## 2020-03-10 PROCEDURE — 80048 BASIC METABOLIC PNL TOTAL CA: CPT

## 2020-03-10 PROCEDURE — 85025 COMPLETE CBC W/AUTO DIFF WBC: CPT

## 2020-03-10 PROCEDURE — P9016 RBC LEUKOCYTES REDUCED: HCPCS

## 2020-03-10 PROCEDURE — A9270 NON-COVERED ITEM OR SERVICE: HCPCS | Performed by: UROLOGY

## 2020-03-10 PROCEDURE — A9270 NON-COVERED ITEM OR SERVICE: HCPCS | Performed by: INTERNAL MEDICINE

## 2020-03-10 PROCEDURE — 770020 HCHG ROOM/CARE - TELE (206)

## 2020-03-10 PROCEDURE — 85014 HEMATOCRIT: CPT

## 2020-03-10 PROCEDURE — 36430 TRANSFUSION BLD/BLD COMPNT: CPT

## 2020-03-10 PROCEDURE — 700102 HCHG RX REV CODE 250 W/ 637 OVERRIDE(OP): Performed by: UROLOGY

## 2020-03-10 RX ORDER — SODIUM CHLORIDE 9 MG/ML
INJECTION, SOLUTION INTRAVENOUS
Status: COMPLETED
Start: 2020-03-10 | End: 2020-03-11

## 2020-03-10 RX ORDER — FAMOTIDINE 20 MG/1
20 TABLET, FILM COATED ORAL 2 TIMES DAILY
Status: DISCONTINUED | OUTPATIENT
Start: 2020-03-10 | End: 2020-03-13 | Stop reason: HOSPADM

## 2020-03-10 RX ADMIN — SENNOSIDES AND DOCUSATE SODIUM 2 TABLET: 8.6; 5 TABLET ORAL at 16:57

## 2020-03-10 RX ADMIN — SODIUM CHLORIDE: 9 INJECTION, SOLUTION INTRAVENOUS at 06:53

## 2020-03-10 RX ADMIN — PRAVASTATIN SODIUM 10 MG: 20 TABLET ORAL at 20:21

## 2020-03-10 RX ADMIN — SODIUM CHLORIDE: 9 INJECTION, SOLUTION INTRAVENOUS at 20:22

## 2020-03-10 RX ADMIN — SENNOSIDES AND DOCUSATE SODIUM 2 TABLET: 8.6; 5 TABLET ORAL at 05:43

## 2020-03-10 RX ADMIN — TERAZOSIN HYDROCHLORIDE 1 MG: 1 CAPSULE ORAL at 20:21

## 2020-03-10 RX ADMIN — FAMOTIDINE 20 MG: 20 TABLET ORAL at 20:21

## 2020-03-10 RX ADMIN — FINASTERIDE 5 MG: 5 TABLET, FILM COATED ORAL at 05:43

## 2020-03-10 ASSESSMENT — ENCOUNTER SYMPTOMS
NAUSEA: 0
MYALGIAS: 0
CHILLS: 0
SHORTNESS OF BREATH: 0
FEVER: 0
ABDOMINAL PAIN: 0
VOMITING: 0

## 2020-03-10 ASSESSMENT — FIBROSIS 4 INDEX: FIB4 SCORE: 2.48

## 2020-03-10 NOTE — PROGRESS NOTES
"Urology Progress Note    Post op Day # 2Cystoscopy and evacuation of clot with fulguration of bleeder due to gross hematuria, urinary clot retention, BPH-    Interval Events: Received PRBC yesterday- today pt urine clearing.     S:  Constitutional: Negative for chills and fever.   Respiratory: Negative for shortness of breath.    Gastrointestinal: Negative for abdominal pain, nausea and vomiting.   Genitourinary: Positive for hematuria- no bladder pain. Negative for dysuria and urgency.   Musculoskeletal: Negative for joint pain and myalgias.   All other systems reviewed and are negative.          O:   BP (!) 99/47   Pulse 87   Temp 36.8 °C (98.2 °F) (Temporal)   Resp 18   Ht 1.854 m (6' 1\")   Wt 73.1 kg (161 lb 2.5 oz)   SpO2 94%   Recent Labs     03/08/20  1624 03/09/20  0253 03/10/20  0436   SODIUM 135 136 138   POTASSIUM 4.2 4.1 4.2   CHLORIDE 108 110 112   CO2 19* 20 23   GLUCOSE 116* 97 99   BUN 41* 35* 19   CREATININE 1.07 0.94 0.81   CALCIUM 7.8* 7.5* 7.9*     Recent Labs     03/08/20  1624 03/09/20  0253  03/09/20  2053 03/10/20  0436 03/10/20  0857   WBC 7.4 6.7  --   --  6.8  --    RBC 2.54* 2.09*  --   --  2.35*  --    HEMOGLOBIN 7.9* 6.4*  6.5*   < > 7.1* 7.1* 7.2*   HEMATOCRIT 23.6* 19.8*   < > 21.3* 22.3* 22.8*   MCV 92.9 94.7  --   --  94.9  --    MCH 31.1 30.6  --   --  30.2  --    MCHC 33.5* 32.3*  --   --  31.8*  --    RDW 55.5* 56.0*  --   --  59.4*  --    PLATELETCT 171 141*  --   --  166  --    MPV 10.6 10.3  --   --  10.2  --     < > = values in this interval not displayed.         Intake/Output Summary (Last 24 hours) at 3/9/2020 1255  Last data filed at 3/9/2020 1045  Gross per 24 hour   Intake 1554.58 ml   Output 92418 ml   Net -80799.42 ml       Exam:  Abdomen soft, benign.    Urine:bloody clear yellow with blood tinged -  no clots. CBI infusing medium low rate    A/P:    Active Hospital Problems    Diagnosis   • Hematuria [R31.9]     Priority: High   • Acute blood loss anemia [D62] "     Priority: Medium   • Essential hypertension [I10]       Stable.   Ambulate, IS.  Titrate CBI to low.  Will make NPO at midnight tonight in case pt will require trip back to OR for cystoscopy fulguration-Eval in am for OR.   Pt okay to keep cath until he has Urolift done by his Urologist in [place].  Pt to remain on Finasteride. Upon discharge.     Plan of care discussed and directed by Everett/ Benoit Mosquera, JC.P.R.N.

## 2020-03-10 NOTE — DISCHARGE PLANNING
@2975  Agency/Facility Name: [facility]  Spoke To: Admissions  Outcome: Just need 3 night stay so can take patient on 3/12.    Received Choice form at 4731  Agency/Facility Name: [facility]  Referral sent per Choice form @ 8936

## 2020-03-10 NOTE — DISCHARGE PLANNING
Anticipated Discharge Disposition: SNF    Action: CM completed SNF choice form for [facility] and sent to [facility].     Barriers to Discharge:     Plan: Await acceptance to [facility].

## 2020-03-10 NOTE — CARE PLAN
1900 patient claiming to have abdominal pain.  Limited output.  Manually irrigated abreu with 240 cc of NS to relieve pain and evacuate clot.  Successful in doing so.

## 2020-03-10 NOTE — PROGRESS NOTES
Handoff report received. Assumed patient care. PT is reclined in bed, AAOx4, worried about being discharged too soon due to urinary clots. Tele box is on, POC discussed with PT and all questions addressed. Safety precautions in place. Call light and personal belongings within reach. Educated to call for assistance if needed.

## 2020-03-10 NOTE — PROGRESS NOTES
"Hospital Medicine Daily Progress Note    Date of Service  3/10/2020    Chief Complaint  84 y.o. male admitted 3/8/2020 with gross hematuria and anemia    Hospital Course    \"See below\"      Interval Problem Update  \"Gross hematuria-eber with CBI in place with intermittent Mikel-Aid colored urine and had no abdominal pain this morning.  Anemia-1 unit of packed red blood cells this morning\"  Clear but per nursing was bloody this morning.    Consultants/Specialty  Urology    Code Status  Full code full care    Disposition  Telemetry    Review of Systems  Review of Systems   Constitutional: Negative for chills and fever.   Respiratory: Negative for shortness of breath.    Gastrointestinal: Negative for abdominal pain, nausea and vomiting.   Genitourinary: Positive for hematuria. Negative for dysuria and urgency.   Musculoskeletal: Negative for joint pain and myalgias.   All other systems reviewed and are negative.       Physical Exam  Temp:  [36.4 °C (97.5 °F)-36.7 °C (98.1 °F)] 36.7 °C (98.1 °F)  Pulse:  [81-97] 91  Resp:  [14-19] 15  BP: ()/(51-61) 123/58  SpO2:  [93 %-98 %] 97 %    Physical Exam  Vitals signs and nursing note reviewed.   Constitutional:       General: He is not in acute distress.     Appearance: He is well-developed.   HENT:      Head: Normocephalic and atraumatic.      Mouth/Throat:      Pharynx: No oropharyngeal exudate.   Eyes:      General: No scleral icterus.     Pupils: Pupils are equal, round, and reactive to light.   Neck:      Musculoskeletal: Normal range of motion and neck supple.      Thyroid: No thyromegaly.   Cardiovascular:      Rate and Rhythm: Normal rate and regular rhythm.      Heart sounds: Normal heart sounds. No murmur.   Pulmonary:      Effort: Pulmonary effort is normal. No respiratory distress.      Breath sounds: Normal breath sounds. No wheezing.   Abdominal:      General: Bowel sounds are normal. There is no distension.      Palpations: Abdomen is soft.      " Tenderness: There is no abdominal tenderness.   Genitourinary:     Penis: Normal.       Comments: CBI in place  Sevilla catheter  Currently urine clear  Musculoskeletal: Normal range of motion.         General: No tenderness.   Skin:     General: Skin is warm and dry.      Findings: No rash.   Neurological:      Mental Status: He is alert and oriented to person, place, and time.      Cranial Nerves: No cranial nerve deficit.         Fluids    Intake/Output Summary (Last 24 hours) at 3/10/2020 0857  Last data filed at 3/10/2020 0653  Gross per 24 hour   Intake 1510 ml   Output 700 ml   Net 810 ml       Laboratory  Recent Labs     03/08/20  1624 03/09/20  0253 03/09/20  0808 03/09/20  2053 03/10/20  0436   WBC 7.4 6.7  --   --  6.8   RBC 2.54* 2.09*  --   --  2.35*   HEMOGLOBIN 7.9* 6.4*  6.5* 7.8* 7.1* 7.1*   HEMATOCRIT 23.6* 19.8* 23.9* 21.3* 22.3*   MCV 92.9 94.7  --   --  94.9   MCH 31.1 30.6  --   --  30.2   MCHC 33.5* 32.3*  --   --  31.8*   RDW 55.5* 56.0*  --   --  59.4*   PLATELETCT 171 141*  --   --  166   MPV 10.6 10.3  --   --  10.2     Recent Labs     03/08/20  1624 03/09/20  0253 03/10/20  0436   SODIUM 135 136 138   POTASSIUM 4.2 4.1 4.2   CHLORIDE 108 110 112   CO2 19* 20 23   GLUCOSE 116* 97 99   BUN 41* 35* 19   CREATININE 1.07 0.94 0.81   CALCIUM 7.8* 7.5* 7.9*     Recent Labs     03/08/20  1624   INR 1.14*               Imaging  No orders to display        Assessment/Plan       VTE prophylaxis: SCDs  Gastrointestinal prophylaxis: Ordered famotidine  Antibiotics:   Ordered Anti-infectives (9999h ago, onward)    None        Diet:   Orders Placed This Encounter   Procedures   • Diet Order Regular     Standing Status:   Standing     Number of Occurrences:   1     Order Specific Question:   Diet:     Answer:   Regular [1]      Prognosis: Guarded  Risk: The Patient is at HIGH risk for inpatient complications and decompensation secondary to his multiple cormorbidities  listed above, especially   Problem    Hematuria   Acute Blood Loss Anemia      I have personally reviewed notes, labs, vitals, imaging.  I discussed the plan of care with bedside RN as well as on multidisciplinary rounds  I have performed a physical exam and reviewed and updated ROS and Plan today 3/10/2020. In review of yesterday's note 3/9/2020   there are no changes except as documented above.

## 2020-03-10 NOTE — CARE PLAN
Problem: Safety  Goal: Will remain free from injury  Outcome: PROGRESSING AS EXPECTED  Note: Patient's risk for injury and falls assessed. Appropriate safety precautions in place. Patient educated to utilize call light for needs. Patient verbalizes understanding.      Problem: Bowel/Gastric:  Goal: Will not experience complications related to bowel motility  Outcome: PROGRESSING SLOWER THAN EXPECTED  Note: Patient bowel function is assessed. Education provided on diet, fluid intake and activities that promote bowel function. Toileting at scheduled intervals in place for patient. Patient verbalizes understanding.

## 2020-03-11 LAB
ALBUMIN SERPL BCP-MCNC: 2.7 G/DL (ref 3.2–4.9)
BUN SERPL-MCNC: 14 MG/DL (ref 8–22)
CALCIUM SERPL-MCNC: 8 MG/DL (ref 8.5–10.5)
CHLORIDE SERPL-SCNC: 113 MMOL/L (ref 96–112)
CO2 SERPL-SCNC: 23 MMOL/L (ref 20–33)
CREAT SERPL-MCNC: 0.83 MG/DL (ref 0.5–1.4)
ERYTHROCYTE [DISTWIDTH] IN BLOOD BY AUTOMATED COUNT: 57.7 FL (ref 35.9–50)
GLUCOSE SERPL-MCNC: 94 MG/DL (ref 65–99)
HCT VFR BLD AUTO: 23.7 % (ref 42–52)
HCT VFR BLD AUTO: 24.8 % (ref 42–52)
HGB BLD-MCNC: 7.7 G/DL (ref 14–18)
HGB BLD-MCNC: 7.9 G/DL (ref 14–18)
MCH RBC QN AUTO: 29.5 PG (ref 27–33)
MCHC RBC AUTO-ENTMCNC: 31.9 G/DL (ref 33.7–35.3)
MCV RBC AUTO: 92.5 FL (ref 81.4–97.8)
PHOSPHATE SERPL-MCNC: 3.1 MG/DL (ref 2.5–4.5)
PLATELET # BLD AUTO: 166 K/UL (ref 164–446)
PMV BLD AUTO: 10 FL (ref 9–12.9)
POTASSIUM SERPL-SCNC: 4.1 MMOL/L (ref 3.6–5.5)
RBC # BLD AUTO: 2.68 M/UL (ref 4.7–6.1)
SODIUM SERPL-SCNC: 141 MMOL/L (ref 135–145)
WBC # BLD AUTO: 6.8 K/UL (ref 4.8–10.8)

## 2020-03-11 PROCEDURE — 97162 PT EVAL MOD COMPLEX 30 MIN: CPT

## 2020-03-11 PROCEDURE — 85018 HEMOGLOBIN: CPT

## 2020-03-11 PROCEDURE — 99233 SBSQ HOSP IP/OBS HIGH 50: CPT | Performed by: INTERNAL MEDICINE

## 2020-03-11 PROCEDURE — 700102 HCHG RX REV CODE 250 W/ 637 OVERRIDE(OP): Performed by: INTERNAL MEDICINE

## 2020-03-11 PROCEDURE — 85027 COMPLETE CBC AUTOMATED: CPT

## 2020-03-11 PROCEDURE — A9270 NON-COVERED ITEM OR SERVICE: HCPCS | Performed by: INTERNAL MEDICINE

## 2020-03-11 PROCEDURE — 770020 HCHG ROOM/CARE - TELE (206)

## 2020-03-11 PROCEDURE — 36415 COLL VENOUS BLD VENIPUNCTURE: CPT

## 2020-03-11 PROCEDURE — 97165 OT EVAL LOW COMPLEX 30 MIN: CPT

## 2020-03-11 PROCEDURE — A9270 NON-COVERED ITEM OR SERVICE: HCPCS | Performed by: UROLOGY

## 2020-03-11 PROCEDURE — 80069 RENAL FUNCTION PANEL: CPT

## 2020-03-11 PROCEDURE — 85014 HEMATOCRIT: CPT

## 2020-03-11 PROCEDURE — 700105 HCHG RX REV CODE 258: Performed by: INTERNAL MEDICINE

## 2020-03-11 PROCEDURE — 700102 HCHG RX REV CODE 250 W/ 637 OVERRIDE(OP): Performed by: UROLOGY

## 2020-03-11 PROCEDURE — 700105 HCHG RX REV CODE 258

## 2020-03-11 RX ADMIN — TERAZOSIN HYDROCHLORIDE 1 MG: 1 CAPSULE ORAL at 19:54

## 2020-03-11 RX ADMIN — SODIUM CHLORIDE 500 ML: 9 INJECTION, SOLUTION INTRAVENOUS at 01:04

## 2020-03-11 RX ADMIN — FAMOTIDINE 20 MG: 20 TABLET ORAL at 05:54

## 2020-03-11 RX ADMIN — PRAVASTATIN SODIUM 10 MG: 20 TABLET ORAL at 19:54

## 2020-03-11 RX ADMIN — SODIUM CHLORIDE: 9 INJECTION, SOLUTION INTRAVENOUS at 13:39

## 2020-03-11 RX ADMIN — FINASTERIDE 5 MG: 5 TABLET, FILM COATED ORAL at 05:54

## 2020-03-11 RX ADMIN — FAMOTIDINE 20 MG: 20 TABLET ORAL at 17:13

## 2020-03-11 ASSESSMENT — COGNITIVE AND FUNCTIONAL STATUS - GENERAL
SUGGESTED CMS G CODE MODIFIER DAILY ACTIVITY: CJ
MOVING TO AND FROM BED TO CHAIR: UNABLE
DAILY ACTIVITIY SCORE: 21
TOILETING: A LITTLE
HELP NEEDED FOR BATHING: A LITTLE
WALKING IN HOSPITAL ROOM: A LITTLE
DRESSING REGULAR LOWER BODY CLOTHING: A LITTLE
STANDING UP FROM CHAIR USING ARMS: A LITTLE
CLIMB 3 TO 5 STEPS WITH RAILING: A LITTLE
MOBILITY SCORE: 15
MOVING FROM LYING ON BACK TO SITTING ON SIDE OF FLAT BED: UNABLE
SUGGESTED CMS G CODE MODIFIER MOBILITY: CK

## 2020-03-11 ASSESSMENT — ENCOUNTER SYMPTOMS
CHILLS: 0
ABDOMINAL PAIN: 0
NAUSEA: 0
VOMITING: 0
SHORTNESS OF BREATH: 0
FEVER: 0
MYALGIAS: 0

## 2020-03-11 ASSESSMENT — GAIT ASSESSMENTS: GAIT LEVEL OF ASSIST: REFUSED

## 2020-03-11 ASSESSMENT — FIBROSIS 4 INDEX: FIB4 SCORE: 2.48

## 2020-03-11 ASSESSMENT — ACTIVITIES OF DAILY LIVING (ADL): TOILETING: INDEPENDENT

## 2020-03-11 NOTE — THERAPY
"Physical Therapy Evaluation completed.   Bed Mobility:  Supine to Sit: Stand by Assist(raised HOB; increased time)  Transfers: Sit to Stand: Minimal Assist(with FWW)  Gait: Level Of Assist: within room only min A with FWW 2/2 request to eat lunch     Plan of Care: Will benefit from Physical Therapy 3 times per week  Discharge Recommendations: Equipment: Will Continue to Assess for Equipment Needs    Pt presents with impaired activity tolerance, dynamic balance, gait mechanics, and strength associated with c/c of dizziness found to have hematuria with severe anemia requiring transfusion and cystoscopy with evac of clot with fulguration of bleeder. Pt is most limited by activity tolerance though feels much better than admission; continue to recommend placement at this time, will follow.     See \"Rehab Therapy-Acute\" Patient Summary Report for complete documentation.     "

## 2020-03-11 NOTE — DISCHARGE PLANNING
Agency/Facility Name: [facility]  Spoke To: Giorgio  Outcome: Need approved service for $748 and will need to call to get a  to come in, will call or leave a message if they get a time.  RN CM informed.    Agency/Facility Name: [facility]  Spoke To: Admissions  Outcome: Can take tomorrow but they do not have transport.

## 2020-03-11 NOTE — PROGRESS NOTES
"Hospital Medicine Daily Progress Note    Date of Service  3/11/2020    Chief Complaint  84 y.o. male admitted 3/8/2020 with gross hematuria and anemia    Hospital Course    \"See below\"      Interval Problem Update  \"Gross hematuria-eber with CBI in place with intermittent Mikel-Aid colored urine and had no abdominal pain this morning.  Anemia-1 unit of packed red blood cells this morning\"  Clear but per nursing was bloody this morning.    Consultants/Specialty  Urology    Code Status  Full code full care    Disposition  [facility] planned likely Friday  [facility] still to follow peripherally, surgery if rebleed    Review of Systems  Review of Systems   Constitutional: Negative for chills and fever.   Respiratory: Negative for shortness of breath.    Gastrointestinal: Negative for abdominal pain, nausea and vomiting.   Genitourinary: Positive for hematuria. Negative for dysuria and urgency.   Musculoskeletal: Negative for joint pain and myalgias.   All other systems reviewed and are negative.       Physical Exam  Temp:  [36.5 °C (97.7 °F)-37.1 °C (98.8 °F)] 36.7 °C (98.1 °F)  Pulse:  [86-96] 86  Resp:  [16-24] 18  BP: (102-143)/(64-74) 102/65  SpO2:  [93 %-97 %] 97 %    Physical Exam  Vitals signs and nursing note reviewed.   Constitutional:       General: He is not in acute distress.     Appearance: He is well-developed.   HENT:      Head: Normocephalic and atraumatic.      Mouth/Throat:      Pharynx: No oropharyngeal exudate.   Eyes:      General: No scleral icterus.     Pupils: Pupils are equal, round, and reactive to light.   Neck:      Musculoskeletal: Normal range of motion and neck supple.      Thyroid: No thyromegaly.   Cardiovascular:      Rate and Rhythm: Normal rate and regular rhythm.      Heart sounds: Normal heart sounds. No murmur.   Pulmonary:      Effort: Pulmonary effort is normal. No respiratory distress.      Breath sounds: Normal breath sounds. No wheezing.   Abdominal:      " "General: Bowel sounds are normal. There is no distension.      Palpations: Abdomen is soft.      Tenderness: There is no abdominal tenderness.   Genitourinary:     Penis: Normal.       Comments: CBI in place  Sevilla catheter  Currently urine clear  Musculoskeletal: Normal range of motion.         General: No tenderness.   Skin:     General: Skin is warm and dry.      Findings: No rash.   Neurological:      Mental Status: He is alert and oriented to person, place, and time.      Cranial Nerves: No cranial nerve deficit.   Psychiatric:      Comments: Not agitated         Fluids    Intake/Output Summary (Last 24 hours) at 3/11/2020 1031  Last data filed at 3/11/2020 0400  Gross per 24 hour   Intake 1440 ml   Output -810 ml   Net 2250 ml       Laboratory  Recent Labs     03/09/20  0253  03/10/20  0436  03/10/20  2043 03/11/20  0429 03/11/20  0856   WBC 6.7  --  6.8  --   --  6.8  --    RBC 2.09*  --  2.35*  --   --  2.68*  --    HEMOGLOBIN 6.4*  6.5*   < > 7.1*   < > 6.6* 7.9* 7.7*   HEMATOCRIT 19.8*   < > 22.3*   < > 20.1* 24.8* 23.7*   MCV 94.7  --  94.9  --   --  92.5  --    MCH 30.6  --  30.2  --   --  29.5  --    MCHC 32.3*  --  31.8*  --   --  31.9*  --    RDW 56.0*  --  59.4*  --   --  57.7*  --    PLATELETCT 141*  --  166  --   --  166  --    MPV 10.3  --  10.2  --   --  10.0  --     < > = values in this interval not displayed.     Recent Labs     03/09/20  0253 03/10/20  0436 03/11/20  0429   SODIUM 136 138 141   POTASSIUM 4.1 4.2 4.1   CHLORIDE 110 112 113*   CO2 20 23 23   GLUCOSE 97 99 94   BUN 35* 19 14   CREATININE 0.94 0.81 0.83   CALCIUM 7.5* 7.9* 8.0*     Recent Labs     03/08/20  1624   INR 1.14*               Imaging  No orders to display        Assessment/Plan  This SmartLink has not been configured with any valid records.     Hematuria- (present on admission)  Assessment & Plan  \"History of recurrent hematuria  On Sevilla catheter, continue CBI and remains bloody  -Status post urological procedure " "fulguration and no clear bladder masses or prostate tumors  Transfuse as needed with target hemoglobin above 7\"  Outpatient follow up to his Urologist at [place] for urolift procedure per Urology  Meanwhile while waiting for discharge to [facility], Urology recommends surgery if rebleeding    Acute blood loss anemia- (present on admission)  Assessment & Plan  \"Related to hematuria  -Status post 1 unit packed red blood cells and check every 12 CBC\"  Hb on the low side, rrequired transfusion 3/10 night    Essential hypertension- (present on admission)  Assessment & Plan  -hold outpatient coreg since BP's remain low  -continue hytrin given prostate issues      VTE prophylaxis: SCDs  Gastrointestinal prophylaxis: Ordered famotidine  Antibiotics:   Ordered Anti-infectives (9999h ago, onward)    None        Diet:   Orders Placed This Encounter   Procedures   • Diet Order Regular     Standing Status:   Standing     Number of Occurrences:   1     Order Specific Question:   Diet:     Answer:   Regular [1]      Prognosis: Guarded  Risk: The Patient is at HIGH risk for inpatient complications and decompensation secondary to his multiple cormorbidities  listed above, especially   Problem   Hematuria   Acute Blood Loss Anemia      I have personally reviewed notes, labs, vitals, imaging.  I discussed the plan of care with bedside RN as well as on multidisciplinary rounds  I have performed a physical exam and reviewed and updated ROS and Plan today 3/11/2020. In review of yesterday's note 3/10/2020   there are no changes except as documented above.       "

## 2020-03-11 NOTE — THERAPY
"Occupational Therapy Evaluation completed.   Functional Status:  Spv w/bed mobility, min A w/full LB dressing, min A w/sit>stand txf to chair w/spv, remained up w/lunch able to complete grooming seated, RN aware of session   Plan of Care: Will benefit from Occupational Therapy 2 times per week  Discharge Recommendations:  Equipment: Will Continue to Assess for Equipment Needs. Post-acute therapy - notes indicate plan to return to care center in [place]    See \"Rehab Therapy-Acute\" Patient Summary Report for complete documentation.      84 yr old male admitted for hematuria and anemia from blood loss. Pt has a PMHx: HTN, BPH, dyslipidemia, hx of hematuria and cystoscopy, cardiomyopathy, aortic valve replacement, hodgkin's lymphoma, and gastrectomy. This admission pt is dx w/gross hematuria POD#3 from cystoscopy and evacuation of clot w/fulguration of bleeder. Pt has been at a [facility] and plans to return will follow in this setting w/the anticipation pt will return to prior facility  "

## 2020-03-11 NOTE — PROGRESS NOTES
"Urology Progress Note    Post op Day # 3- Cystoscopy and evacuation of clot with fulguration of bleeder due to gross hematuria, urinary clot retention, BPH-    Interval Events: urine has been clear.     S:  Constitutional: Negative for chills and fever.   Respiratory: Negative for shortness of breath.    Gastrointestinal: Negative for abdominal pain, nausea and vomiting.   Genitourinary: Positive for  abreu cath clear yellow urine blood tinged- no bladder pain. Negative for dysuria and urgency.   Musculoskeletal: Negative for joint pain and myalgias.   All other systems reviewed and are negative.          O:   /65   Pulse 86   Temp 36.7 °C (98.1 °F) (Temporal)   Resp 18   Ht 1.854 m (6' 1\")   Wt 73.2 kg (161 lb 6 oz)   SpO2 97%   Recent Labs     03/09/20  0253 03/10/20  0436 03/11/20  0429   SODIUM 136 138 141   POTASSIUM 4.1 4.2 4.1   CHLORIDE 110 112 113*   CO2 20 23 23   GLUCOSE 97 99 94   BUN 35* 19 14   CREATININE 0.94 0.81 0.83   CALCIUM 7.5* 7.9* 8.0*     Recent Labs     03/09/20  0253  03/10/20  0436  03/10/20  2043 03/11/20  0429 03/11/20  0856   WBC 6.7  --  6.8  --   --  6.8  --    RBC 2.09*  --  2.35*  --   --  2.68*  --    HEMOGLOBIN 6.4*  6.5*   < > 7.1*   < > 6.6* 7.9* 7.7*   HEMATOCRIT 19.8*   < > 22.3*   < > 20.1* 24.8* 23.7*   MCV 94.7  --  94.9  --   --  92.5  --    MCH 30.6  --  30.2  --   --  29.5  --    MCHC 32.3*  --  31.8*  --   --  31.9*  --    RDW 56.0*  --  59.4*  --   --  57.7*  --    PLATELETCT 141*  --  166  --   --  166  --    MPV 10.3  --  10.2  --   --  10.0  --     < > = values in this interval not displayed.         Intake/Output Summary (Last 24 hours) at 3/9/2020 1255  Last data filed at 3/9/2020 1045  Gross per 24 hour   Intake 1554.58 ml   Output 27108 ml   Net -47820.42 ml       Exam:  Abdomen soft, benign.    Urine clear yellow with blood tinged -  no clots. CBI off and clamped    A/P:    Active Hospital Problems    Diagnosis   • Hematuria [R31.9]     Priority: " High   • Acute blood loss anemia [D62]     Priority: Medium   • Essential hypertension [I10]       Stable.   Ambulate, IS.  Okay to dc home with Sevilla and Finasteride.     Ptto keep cath until he has Urolift performed in the next 1-2 weeks to be done by his  Urologist in [place]. Pt okay to dc home when okay by hospitalist. If pt does stay tonight then please have pt NPO tonight for possible surgery tomorrow if he re- bleeds or has change in status.         Plan of care discussed and directed by [place]/ Benoit Mosquera, JC.P.R.N.

## 2020-03-11 NOTE — CARE PLAN
Problem: Safety  Goal: Will remain free from injury  Outcome: PROGRESSING AS EXPECTED     Problem: Knowledge Deficit  Goal: Knowledge of disease process/condition, treatment plan, diagnostic tests, and medications will improve  Outcome: PROGRESSING AS EXPECTED     Problem: Urinary Elimination:  Goal: Ability to reestablish a normal urinary elimination pattern will improve  Outcome: PROGRESSING AS EXPECTED

## 2020-03-12 LAB
ERYTHROCYTE [DISTWIDTH] IN BLOOD BY AUTOMATED COUNT: 57.7 FL (ref 35.9–50)
HCT VFR BLD AUTO: 23.3 % (ref 42–52)
HGB BLD-MCNC: 7.7 G/DL (ref 14–18)
MCH RBC QN AUTO: 30.7 PG (ref 27–33)
MCHC RBC AUTO-ENTMCNC: 33 G/DL (ref 33.7–35.3)
MCV RBC AUTO: 92.8 FL (ref 81.4–97.8)
PLATELET # BLD AUTO: 175 K/UL (ref 164–446)
PMV BLD AUTO: 9.8 FL (ref 9–12.9)
RBC # BLD AUTO: 2.51 M/UL (ref 4.7–6.1)
WBC # BLD AUTO: 6.6 K/UL (ref 4.8–10.8)

## 2020-03-12 PROCEDURE — A9270 NON-COVERED ITEM OR SERVICE: HCPCS | Performed by: UROLOGY

## 2020-03-12 PROCEDURE — 700102 HCHG RX REV CODE 250 W/ 637 OVERRIDE(OP): Performed by: UROLOGY

## 2020-03-12 PROCEDURE — 700102 HCHG RX REV CODE 250 W/ 637 OVERRIDE(OP): Performed by: INTERNAL MEDICINE

## 2020-03-12 PROCEDURE — 700105 HCHG RX REV CODE 258: Performed by: INTERNAL MEDICINE

## 2020-03-12 PROCEDURE — 99231 SBSQ HOSP IP/OBS SF/LOW 25: CPT | Performed by: INTERNAL MEDICINE

## 2020-03-12 PROCEDURE — 770020 HCHG ROOM/CARE - TELE (206)

## 2020-03-12 PROCEDURE — A9270 NON-COVERED ITEM OR SERVICE: HCPCS | Performed by: INTERNAL MEDICINE

## 2020-03-12 PROCEDURE — 36415 COLL VENOUS BLD VENIPUNCTURE: CPT

## 2020-03-12 PROCEDURE — 85027 COMPLETE CBC AUTOMATED: CPT

## 2020-03-12 RX ADMIN — FAMOTIDINE 20 MG: 20 TABLET ORAL at 16:50

## 2020-03-12 RX ADMIN — SODIUM CHLORIDE: 9 INJECTION, SOLUTION INTRAVENOUS at 17:50

## 2020-03-12 RX ADMIN — FINASTERIDE 5 MG: 5 TABLET, FILM COATED ORAL at 05:40

## 2020-03-12 RX ADMIN — TERAZOSIN HYDROCHLORIDE 1 MG: 1 CAPSULE ORAL at 21:52

## 2020-03-12 RX ADMIN — SODIUM CHLORIDE: 9 INJECTION, SOLUTION INTRAVENOUS at 03:18

## 2020-03-12 RX ADMIN — PRAVASTATIN SODIUM 10 MG: 20 TABLET ORAL at 21:52

## 2020-03-12 RX ADMIN — FAMOTIDINE 20 MG: 20 TABLET ORAL at 05:40

## 2020-03-12 ASSESSMENT — ENCOUNTER SYMPTOMS
ABDOMINAL PAIN: 0
NAUSEA: 0
MYALGIAS: 0
CHILLS: 0
SHORTNESS OF BREATH: 0
VOMITING: 0
FEVER: 0

## 2020-03-12 ASSESSMENT — FIBROSIS 4 INDEX: FIB4 SCORE: 2.35

## 2020-03-12 NOTE — PROGRESS NOTES
"Hospital Medicine Daily Progress Note    Date of Service  3/12/2020    Chief Complaint  84 y.o. male admitted 3/8/2020 with gross hematuria and anemia    Hospital Course    \"See below\"      Interval Problem Update  \"Gross hematuria-eber with CBI in place with intermittent Mikel-Aid colored urine and had no abdominal pain this morning.  Anemia-1 unit of packed red blood cells this morning\"  Sevilla has been clear, no bloody urine    Consultants/Specialty  Urology    Code Status  Full code full care    Disposition  [facility] planned likely Friday, transport being arranged  [facility] still to follow peripherally, surgery if rebleed    Review of Systems  Review of Systems   Constitutional: Negative for chills and fever.   Respiratory: Negative for shortness of breath.    Gastrointestinal: Negative for abdominal pain, nausea and vomiting.   Genitourinary: Positive for hematuria. Negative for dysuria and urgency.   Musculoskeletal: Negative for joint pain and myalgias.   All other systems reviewed and are negative.       Physical Exam  Temp:  [36.4 °C (97.5 °F)-37.2 °C (98.9 °F)] 37.2 °C (98.9 °F)  Pulse:  [] 98  Resp:  [16-18] 17  BP: (100-145)/(61-82) 145/82  SpO2:  [92 %-99 %] 95 %    Physical Exam  Vitals signs and nursing note reviewed.   Constitutional:       General: He is not in acute distress.     Appearance: He is well-developed.   HENT:      Head: Normocephalic and atraumatic.      Mouth/Throat:      Pharynx: No oropharyngeal exudate.   Eyes:      General: No scleral icterus.     Pupils: Pupils are equal, round, and reactive to light.   Neck:      Musculoskeletal: Normal range of motion and neck supple.      Thyroid: No thyromegaly.   Cardiovascular:      Rate and Rhythm: Normal rate and regular rhythm.      Heart sounds: Normal heart sounds. No murmur.   Pulmonary:      Effort: Pulmonary effort is normal. No respiratory distress.      Breath sounds: Normal breath sounds. No wheezing. " "  Abdominal:      General: Bowel sounds are normal. There is no distension.      Palpations: Abdomen is soft.      Tenderness: There is no abdominal tenderness.   Genitourinary:     Penis: Normal.       Comments: CBI in place  Sevilla catheter  Currently urine clear  Musculoskeletal: Normal range of motion.         General: No tenderness.   Skin:     General: Skin is warm and dry.      Findings: No rash.   Neurological:      Mental Status: He is alert and oriented to person, place, and time.      Cranial Nerves: No cranial nerve deficit.   Psychiatric:      Comments: Cooperative         Fluids    Intake/Output Summary (Last 24 hours) at 3/12/2020 0913  Last data filed at 3/12/2020 0318  Gross per 24 hour   Intake 240 ml   Output 800 ml   Net -560 ml       Laboratory  Recent Labs     03/10/20  0436  03/11/20  0429 03/11/20  0856 03/12/20  0354   WBC 6.8  --  6.8  --  6.6   RBC 2.35*  --  2.68*  --  2.51*   HEMOGLOBIN 7.1*   < > 7.9* 7.7* 7.7*   HEMATOCRIT 22.3*   < > 24.8* 23.7* 23.3*   MCV 94.9  --  92.5  --  92.8   MCH 30.2  --  29.5  --  30.7   MCHC 31.8*  --  31.9*  --  33.0*   RDW 59.4*  --  57.7*  --  57.7*   PLATELETCT 166  --  166  --  175   MPV 10.2  --  10.0  --  9.8    < > = values in this interval not displayed.     Recent Labs     03/10/20  0436 03/11/20 0429   SODIUM 138 141   POTASSIUM 4.2 4.1   CHLORIDE 112 113*   CO2 23 23   GLUCOSE 99 94   BUN 19 14   CREATININE 0.81 0.83   CALCIUM 7.9* 8.0*                   Imaging  No orders to display        Assessment/Plan  This SmartLink has not been configured with any valid records.     Hematuria- (present on admission)  Assessment & Plan  \"History of recurrent hematuria  On Sevilla catheter, continue CBI and remains bloody  -Status post urological procedure fulguration and no clear bladder masses or prostate tumors  Transfuse as needed with target hemoglobin above 7\"  Outpatient follow up to his Urologist at [place] for urolift procedure per Urology  Meanwhile " "while waiting for discharge to [facility], Urology recommends surgery if rebleeding    Acute blood loss anemia- (present on admission)  Assessment & Plan  \"Related to hematuria  -Status post 1 unit packed red blood cells and check every 12 CBC\"  Hb on the low side, rrequired transfusion 3/10 night    Essential hypertension- (present on admission)  Assessment & Plan  -hold outpatient coreg since BP's remain low  -continue hytrin given prostate issues      VTE prophylaxis: SCDs  Gastrointestinal prophylaxis: Ordered famotidine  Antibiotics:   Ordered Anti-infectives (9999h ago, onward)    None        Diet:   Orders Placed This Encounter   Procedures   • Diet Order Regular     Standing Status:   Standing     Number of Occurrences:   1     Order Specific Question:   Diet:     Answer:   Regular [1]      Prognosis: Guarded  Risk: The Patient is at HIGH risk for inpatient complications and decompensation secondary to his multiple cormorbidities  listed above, especially   Problem   Hematuria   Acute Blood Loss Anemia      I have personally reviewed notes, labs, vitals, imaging.  I discussed the plan of care with bedside RN as well as on multidisciplinary rounds  I have performed a physical exam and reviewed and updated ROS and Plan today 3/12/2020. In review of yesterday's note 3/11/2020   there are no changes except as documented above.       "

## 2020-03-12 NOTE — CARE PLAN
Problem: Safety  Goal: Will remain free from injury  Outcome: PROGRESSING AS EXPECTED     Problem: Discharge Barriers/Planning  Goal: Patient's continuum of care needs will be met  Outcome: PROGRESSING AS EXPECTED     Problem: Urinary Elimination:  Goal: Ability to reestablish a normal urinary elimination pattern will improve  Outcome: PROGRESSING AS EXPECTED

## 2020-03-12 NOTE — DISCHARGE PLANNING
Anticipated Discharge Disposition: SNF    Action: Pt would like to return to [facility] at d/c and Dr. Musa is ready to let pt d/c tomorrow.   Carlos LOMAS checked with [facility] and they are able to take pt to [facility] tomorrow and they will leave at 10 am. CM updated bedside RN and Dr. Musa.     Barriers to Discharge:     Plan: Await d/c summary and complete chart copy and COBRA.

## 2020-03-12 NOTE — DISCHARGE PLANNING
Agency/Facility Name: [facility]  Spoke To: Scheduling  Outcome: Set up transport for 1000 on 3/13.    Agency/Facility Name: [facility]  Spoke To:   Outcome: Left message informing of transport time.

## 2020-03-12 NOTE — PROGRESS NOTES
"Urology Progress Note    Post op Day # - Cystoscopy and evacuation of clot with fulguration of bleeder due to gross hematuria, urinary clot retention, BPH-    Interval Events: urine has been clear. X 3 days    S:  Constitutional: Negative for chills and fever.   Respiratory: Negative for shortness of breath.    Gastrointestinal: Negative for abdominal pain, nausea and vomiting.   Genitourinary: Positive for  abreu cath clear yellow urine- no bladder pain. Negative for dysuria and urgency.   Musculoskeletal: Negative for joint pain and myalgias.   All other systems reviewed and are negative.          O:   /82   Pulse 98   Temp 37.2 °C (98.9 °F) (Temporal)   Resp 17   Ht 1.854 m (6' 1\")   Wt 71.6 kg (157 lb 13.6 oz)   SpO2 95%   Recent Labs     03/10/20  0436 03/11/20  0429   SODIUM 138 141   POTASSIUM 4.2 4.1   CHLORIDE 112 113*   CO2 23 23   GLUCOSE 99 94   BUN 19 14   CREATININE 0.81 0.83   CALCIUM 7.9* 8.0*     Recent Labs     03/10/20  0436  03/11/20  0429 03/11/20  0856 03/12/20  0354   WBC 6.8  --  6.8  --  6.6   RBC 2.35*  --  2.68*  --  2.51*   HEMOGLOBIN 7.1*   < > 7.9* 7.7* 7.7*   HEMATOCRIT 22.3*   < > 24.8* 23.7* 23.3*   MCV 94.9  --  92.5  --  92.8   MCH 30.2  --  29.5  --  30.7   MCHC 31.8*  --  31.9*  --  33.0*   RDW 59.4*  --  57.7*  --  57.7*   PLATELETCT 166  --  166  --  175   MPV 10.2  --  10.0  --  9.8    < > = values in this interval not displayed.         Intake/Output Summary (Last 24 hours) at 3/9/2020 1255  Last data filed at 3/9/2020 1045  Gross per 24 hour   Intake 1554.58 ml   Output 76083 ml   Net -34850.42 ml     Vitals signs and nursing note reviewed.   Constitutional:       General: He is not in acute distress.     Appearance: He is well-developed.   HENT:      Head: Normocephalic and atraumatic.      Mouth/Throat:      Pharynx: No oropharyngeal exudate.   Eyes:      General: No scleral icterus.     Pupils: Pupils are equal, round, and reactive to light.   Neck:      " Musculoskeletal: Normal range of motion and neck supple.      Thyroid: No thyromegaly.   Cardiovascular:      Rate and Rhythm: Normal rate and regular rhythm.      Heart sounds: Normal heart sounds. No murmur.   Pulmonary:      Effort: Pulmonary effort is normal. No respiratory distress.      Breath sounds: Normal breath sounds. No wheezing.   Abdominal:      General: Bowel sounds are normal. There is no distension.      Palpations: Abdomen is soft.      Tenderness: There is no abdominal tenderness.   Genitourinary:     Penis: Normal.       Comments: CBI clamped - urine yellow to down drain.   Sevilla catheter  Musculoskeletal: Normal range of motion.         General: No tenderness.   Skin:     General: Skin is warm and dry.      Findings: No rash.   Neurological:      Mental Status: He is alert and oriented to person, place, and time.      Cranial Nerves: No cranial nerve deficit.   Psychiatric:      Comments: Not agitated     A/P:    Active Hospital Problems    Diagnosis   • Hematuria [R31.9]     Priority: High   • Acute blood loss anemia [D62]     Priority: Medium   • Essential hypertension [I10]       Stable.   Ambulate, IS.  Okay to dc home with Sevilla and Finasteride.     Pt is to keep cath until he has Urolift performed that is scheduled for 3/20 with  Urologist in [place]. Pt okay to dc home when okay by hospitalist.-   [facility] signing off.         Plan of care discussed and directed by [place]/ Benoit Moqsuera, A.P.R.N.

## 2020-03-13 VITALS
TEMPERATURE: 98.3 F | RESPIRATION RATE: 18 BRPM | HEART RATE: 101 BPM | DIASTOLIC BLOOD PRESSURE: 74 MMHG | WEIGHT: 152.56 LBS | SYSTOLIC BLOOD PRESSURE: 136 MMHG | HEIGHT: 73 IN | BODY MASS INDEX: 20.22 KG/M2 | OXYGEN SATURATION: 91 %

## 2020-03-13 LAB
ERYTHROCYTE [DISTWIDTH] IN BLOOD BY AUTOMATED COUNT: 59.5 FL (ref 35.9–50)
HCT VFR BLD AUTO: 24.4 % (ref 42–52)
HGB BLD-MCNC: 8 G/DL (ref 14–18)
MCH RBC QN AUTO: 30.4 PG (ref 27–33)
MCHC RBC AUTO-ENTMCNC: 32.8 G/DL (ref 33.7–35.3)
MCV RBC AUTO: 92.8 FL (ref 81.4–97.8)
PLATELET # BLD AUTO: 182 K/UL (ref 164–446)
PMV BLD AUTO: 9.9 FL (ref 9–12.9)
RBC # BLD AUTO: 2.63 M/UL (ref 4.7–6.1)
WBC # BLD AUTO: 6.2 K/UL (ref 4.8–10.8)

## 2020-03-13 PROCEDURE — 700102 HCHG RX REV CODE 250 W/ 637 OVERRIDE(OP): Performed by: INTERNAL MEDICINE

## 2020-03-13 PROCEDURE — 85027 COMPLETE CBC AUTOMATED: CPT

## 2020-03-13 PROCEDURE — 99239 HOSP IP/OBS DSCHRG MGMT >30: CPT | Performed by: INTERNAL MEDICINE

## 2020-03-13 PROCEDURE — A9270 NON-COVERED ITEM OR SERVICE: HCPCS | Performed by: INTERNAL MEDICINE

## 2020-03-13 PROCEDURE — 36415 COLL VENOUS BLD VENIPUNCTURE: CPT

## 2020-03-13 PROCEDURE — 700102 HCHG RX REV CODE 250 W/ 637 OVERRIDE(OP): Performed by: UROLOGY

## 2020-03-13 PROCEDURE — A9270 NON-COVERED ITEM OR SERVICE: HCPCS | Performed by: UROLOGY

## 2020-03-13 RX ORDER — POLYETHYLENE GLYCOL 3350 17 G/17G
POWDER, FOR SOLUTION ORAL
Refills: 3 | COMMUNITY
Start: 2020-03-13

## 2020-03-13 RX ORDER — CARVEDILOL 3.12 MG/1
6.25 TABLET ORAL 2 TIMES DAILY WITH MEALS
Qty: 60 TAB | Refills: 0 | Status: SHIPPED | OUTPATIENT
Start: 2020-03-13

## 2020-03-13 RX ORDER — ONDANSETRON 4 MG/1
4 TABLET, ORALLY DISINTEGRATING ORAL EVERY 4 HOURS PRN
Qty: 10 TAB | Refills: 0
Start: 2020-03-13

## 2020-03-13 RX ORDER — FINASTERIDE 5 MG/1
5 TABLET, FILM COATED ORAL DAILY
Qty: 30 TAB
Start: 2020-03-14

## 2020-03-13 RX ORDER — CARVEDILOL 6.25 MG/1
25 TABLET ORAL 2 TIMES DAILY WITH MEALS
Qty: 60 TAB | Refills: 0 | Status: SHIPPED | OUTPATIENT
Start: 2020-03-13 | End: 2020-03-13 | Stop reason: SDUPTHER

## 2020-03-13 RX ORDER — FAMOTIDINE 20 MG/1
20 TABLET, FILM COATED ORAL 2 TIMES DAILY
Qty: 60 TAB
Start: 2020-03-13

## 2020-03-13 RX ORDER — AMOXICILLIN 250 MG
2 CAPSULE ORAL 2 TIMES DAILY
Qty: 30 TAB | Refills: 0
Start: 2020-03-13

## 2020-03-13 RX ORDER — LISINOPRIL 2.5 MG/1
2.5 TABLET ORAL DAILY
Qty: 30 TAB | Refills: 0 | Status: SHIPPED
Start: 2020-03-13 | End: 2020-03-13

## 2020-03-13 RX ORDER — ACETAMINOPHEN 325 MG/1
650 TABLET ORAL EVERY 6 HOURS PRN
Qty: 30 TAB | Refills: 0
Start: 2020-03-13

## 2020-03-13 RX ADMIN — FINASTERIDE 5 MG: 5 TABLET, FILM COATED ORAL at 05:58

## 2020-03-13 RX ADMIN — FAMOTIDINE 20 MG: 20 TABLET ORAL at 05:58

## 2020-03-13 NOTE — PROGRESS NOTES
Bedside report received. Patient sitting up in  Bed, tele monitor in place.. RN assessed needs; no additional needs at this time. Call light within reach. Fall precautions in place.  .

## 2020-03-13 NOTE — CARE PLAN
Problem: Communication  Goal: The ability to communicate needs accurately and effectively will improve  Outcome: PROGRESSING AS EXPECTED     Problem: Safety  Goal: Will remain free from injury  Outcome: PROGRESSING AS EXPECTED  Goal: Will remain free from falls  Outcome: PROGRESSING AS EXPECTED     Problem: Infection  Goal: Will remain free from infection  Outcome: PROGRESSING AS EXPECTED     Problem: Venous Thromboembolism (VTW)/Deep Vein Thrombosis (DVT) Prevention:  Goal: Patient will participate in Venous Thrombosis (VTE)/Deep Vein Thrombosis (DVT)Prevention Measures  Outcome: PROGRESSING AS EXPECTED     Problem: Bowel/Gastric:  Goal: Normal bowel function is maintained or improved  Outcome: PROGRESSING AS EXPECTED  Goal: Will not experience complications related to bowel motility  Outcome: PROGRESSING AS EXPECTED     Problem: Knowledge Deficit  Goal: Knowledge of disease process/condition, treatment plan, diagnostic tests, and medications will improve  Outcome: PROGRESSING AS EXPECTED  Goal: Knowledge of the prescribed therapeutic regimen will improve  Outcome: PROGRESSING AS EXPECTED     Problem: Discharge Barriers/Planning  Goal: Patient's continuum of care needs will be met  Outcome: PROGRESSING AS EXPECTED     Problem: Urinary Elimination:  Goal: Ability to reestablish a normal urinary elimination pattern will improve  Outcome: PROGRESSING AS EXPECTED     Problem: Mobility  Goal: Risk for activity intolerance will decrease  Outcome: PROGRESSING AS EXPECTED     Problem: Pain Management  Goal: Pain level will decrease to patient's comfort goal  Outcome: PROGRESSING AS EXPECTED

## 2020-03-13 NOTE — DISCHARGE INSTRUCTIONS
Discharge Instructions    Discharged to other by medical transportation with self. Discharged via wheelchair, hospital escort: Yes.  Special equipment needed: Not Applicable    Be sure to schedule a follow-up appointment with your primary care doctor or any specialists as instructed.     Discharge Plan:   Diet Plan: Discussed  Confirmed Follow up Appointment: No (Comments)  Confirmed Symptoms Management: Discussed  Medication Reconciliation Updated: Yes  Influenza Vaccine Indication: Not indicated: History of severe allergic reaction to influenza vaccine    I understand that a diet low in cholesterol, fat, and sodium is recommended for good health. Unless I have been given specific instructions below for another diet, I accept this instruction as my diet prescription.   Other diet: Regular    Special Instructions: None    · Is patient discharged on Warfarin / Coumadin?   No     Hematuria  Hematuria is the presence of blood in the urine. This condition can result from many problems. Some of these are:   · Urinary infections.   · Injuries.   · Kidney stones.   · Drug reactions.   · Tumors.   · Other diseases.   The treatment depends on the diagnosis. Further studies may be needed to find the cause even if the hematuria subsides on its own. An exam by an urologist may also be indicated.  If you are bleeding heavily, or have prostate problems, clots can form in the bladder and block the passage of urine. This requires emergency treatment with a catheter and bladder irrigation.   Contact your doctor for follow-up care within the next 2-4 days.  SEEK IMMEDIATE MEDICAL CARE IF:  You develop a fever, abdominal pain, urinary blockage, vomiting, or any other serious problems.  Document Released: 01/25/2006 Document Revised: 03/11/2013 Document Reviewed: 10/30/2009  [employer]® Patient Information ©2013 [employer].    Famotidine tablets or gelcaps  What is this medicine?  FAMOTIDINE (fa ELDON churchill) is a type of antihistamine  that blocks the release of stomach acid. It is used to treat stomach or intestinal ulcers. It can also relieve heartburn from acid reflux.  This medicine may be used for other purposes; ask your health care provider or pharmacist if you have questions.  COMMON BRAND NAME(S): Heartburn Relief, Pepcid, Pepcid AC, Pepcid AC Maximum Strength  What should I tell my health care provider before I take this medicine?  They need to know if you have any of these conditions:  -kidney or liver disease  -trouble swallowing  -an unusual or allergic reaction to famotidine, other medicines, foods, dyes, or preservatives  -pregnant or trying to get pregnant  -breast-feeding  How should I use this medicine?  Take this medicine by mouth with a glass of water. Follow the directions on the prescription label. If you only take this medicine once a day, take it at bedtime. Take your doses at regular intervals. Do not take your medicine more often than directed.  Talk to your pediatrician regarding the use of this medicine in children. Special care may be needed.  Overdosage: If you think you have taken too much of this medicine contact a poison control center or emergency room at once.  NOTE: This medicine is only for you. Do not share this medicine with others.  What if I miss a dose?  If you miss a dose, take it as soon as you can. If it is almost time for your next dose, take only that dose. Do not take double or extra doses.  What may interact with this medicine?  -delavirdine  -itraconazole  -ketoconazole  This list may not describe all possible interactions. Give your health care provider a list of all the medicines, herbs, non-prescription drugs, or dietary supplements you use. Also tell them if you smoke, drink alcohol, or use illegal drugs. Some items may interact with your medicine.  What should I watch for while using this medicine?  Tell your doctor or health care professional if your condition does not start to get better or  if it gets worse. Finish the full course of tablets prescribed, even if you feel better.  Do not take with aspirin, ibuprofen or other antiinflammatory medicines. These can make your condition worse.  Do not smoke cigarettes or drink alcohol. These cause irritation in your stomach and can increase the time it will take for ulcers to heal.  If you get black, tarry stools or vomit up what looks like coffee grounds, call your doctor or health care professional at once. You may have a bleeding ulcer.  What side effects may I notice from receiving this medicine?  Side effects that you should report to your doctor or health care professional as soon as possible:  -agitation, nervousness  -confusion  -hallucinations  -skin rash, itching  Side effects that usually do not require medical attention (report to your doctor or health care professional if they continue or are bothersome):  -constipation  -diarrhea  -dizziness  -headache  This list may not describe all possible side effects. Call your doctor for medical advice about side effects. You may report side effects to FDA at 4-790-FDA-7441.  Where should I keep my medicine?  Keep out of the reach of children.  Store at room temperature between 15 and 30 degrees C (59 and 86 degrees F). Do not freeze. Throw away any unused medicine after the expiration date.  NOTE: This sheet is a summary. It may not cover all possible information. If you have questions about this medicine, talk to your doctor, pharmacist, or health care provider.  © 2018 Elsevier/Gold Standard (2014-04-09 14:45:49)    Finasteride (Proscar) tablets  What is this medicine?  FINASTERIDE (fi ALLISON charlene michelle) is used to treat benign prostatic hyperplasia (BPH) in men. This is a condition that causes you to have an enlarged prostate. This medicine helps to control your symptoms, decrease urinary retention, and reduces your risk of needing surgery. When used in combination with certain other medicines, this drug  can slow down the progression of your disease.  This medicine may be used for other purposes; ask your health care provider or pharmacist if you have questions.  COMMON BRAND NAME(S): Proscar  What should I tell my health care provider before I take this medicine?  They need to know if you have any of these conditions:  -liver disease  -an unusual or allergic reaction to finasteride, other medicines, foods, dyes, or preservatives  -pregnant or trying to get pregnant  -breast-feeding  How should I use this medicine?  Take this medicine by mouth with a glass of water. Follow the directions on the prescription label. You can take this medicine with or without food. Take your doses at regular intervals. Do not take your medicine more often than directed. Do not stop taking except on the advice of your doctor or health care professional.  Talk to your pediatrician regarding the use of this medicine in children. Special care may be needed.  Overdosage: If you think you have taken too much of this medicine contact a poison control center or emergency room at once.  NOTE: This medicine is only for you. Do not share this medicine with others.  What if I miss a dose?  If you miss a dose, take it as soon as you can. If it is almost time for your next dose, take only that dose. Do not take double or extra doses.  What may interact with this medicine?  -saw palmetto or other dietary supplements  This list may not describe all possible interactions. Give your health care provider a list of all the medicines, herbs, non-prescription drugs, or dietary supplements you use. Also tell them if you smoke, drink alcohol, or use illegal drugs. Some items may interact with your medicine.  What should I watch for while using this medicine?  Do not donate blood while you are taking this medicine. This will prevent giving this medicine to a pregnant female through a blood transfusion. Ask your doctor or health care professional when it is  safe to donate blood after you stop taking this medicine.  Women who are pregnant or may get pregnant must not handle broken or crushed finasteride tablets. The active ingredient could harm the unborn baby. If a pregnant woman comes into contact with broken or crushed tablets she should check with her doctor or health care professional. Exposure to whole tablets is not expected to cause harm as long as they are not swallowed.  Contact your doctor or health care professional if your symptoms do not start to get better. You may need to take this medicine for 6 to 12 months to get the best results.  This medicine can interfere with PSA laboratory tests for prostate cancer. If you are scheduled to have a lab test for prostate cancer, tell your doctor or health care professional that you are taking this medicine.  This medicine may increase your risk of getting some cancers, like breast cancer. Talk with your doctor.  What side effects may I notice from receiving this medicine?  Side effects that you should report to your doctor or health care professional as soon as possible:  -any signs of an allergic reaction like rash, itching, hives or swelling of the lips or face  -changes in breast like lumps, pain or fluids leaking from the nipple  -pain in the testicles  Side effects that usually do not require medical attention (report to your doctor or health care professional if they continue or are bothersome):  -sexual difficulties like decreased sexual desire or ability to get an erection  -small amount of semen released during sex  This list may not describe all possible side effects. Call your doctor for medical advice about side effects. You may report side effects to FDA at 3-033-FDA-4541.  Where should I keep my medicine?  Keep out of the reach of children.  Store at room temperature below 30 degrees C (86 degrees F). Protect from light. Keep container tightly closed. Throw away any unused medicine after the  expiration date.  NOTE: This sheet is a summary. It may not cover all possible information. If you have questions about this medicine, talk to your doctor, pharmacist, or health care provider.  © 2018 Elsevier/Gold Standard (2016-08-04 17:24:30)      Depression / Suicide Risk    As you are discharged from this [facility] facility, it is important to learn how to keep safe from harming yourself.    Recognize the warning signs:  · Abrupt changes in personality, positive or negative- including increase in energy   · Giving away possessions  · Change in eating patterns- significant weight changes-  positive or negative  · Change in sleeping patterns- unable to sleep or sleeping all the time   · Unwillingness or inability to communicate  · Depression  · Unusual sadness, discouragement and loneliness  · Talk of wanting to die  · Neglect of personal appearance   · Rebelliousness- reckless behavior  · Withdrawal from people/activities they love  · Confusion- inability to concentrate     If you or a loved one observes any of these behaviors or has concerns about self-harm, here's what you can do:  · Talk about it- your feelings and reasons for harming yourself  · Remove any means that you might use to hurt yourself (examples: pills, rope, extension cords, firearm)  · Get professional help from the community (Mental Health, Substance Abuse, psychological counseling)  · Do not be alone:Call your Safe Contact- someone whom you trust who will be there for you.  · Call your local CRISIS HOTLINE 664-8497 or 659-069-8353  · Call your local Children's Mobile Crisis Response Team [place] (030) 303-5935 or www.[employer]  · Call the toll free National Suicide Prevention Hotlines   · National Suicide Prevention Lifeline 664-139-CZNB (9343)  · National Hope Line Network 800-SUICIDE (966-4140)

## 2020-03-13 NOTE — DISCHARGE SUMMARY
Discharge Summary    CHIEF COMPLAINT ON ADMISSION  No chief complaint on file.      Reason for Admission  anemia w/ blood loss     CODE STATUS  Full Code    HPI & HOSPITAL COURSE  This is a 84 y.o. male here with hematuria  Please review Dr. Gabo Horne M.D. notes for further details of history of present illness, past medical/social/family histories, allergies and medications. Please review Dr. Floyd, Urology consultation notes.  History of BPH  History of aortic stenosis/AVR? And HTN. Was on high dose carvedilol and lisinopril.  Presented with bloody urine.  Admitted to outside facility where he was found to have severe anemia and received 3 unit of blood transfusion over there.  Transferred here for urology consult.  Dr. Boyle was consulted on his way here.  Chart from outside facility:  Labs WBC 6, hemoglobin 7.3, platelet 166  Sodium 135, potassium 4.1, chloride 111, CO2 18, glucose 133, BUN 44, creatinine 1.3  CT scan of the abdomen  Diffuse bladder wall thickening with a centimeter posterior bladder mass, atrophic left kidney with simple cyst about 5.5 cm in size, cholelithiasis without evidence of cholecystitis, colonic diverticulosis  He underwent cystoscopy and evacuation of clot with fulguration of bleeder by Dr. Floyd on 3/8 for:  1. Gross hematuria  2. Urinary clot retention  3. BPH  He was given blood transfusions. His Hb stabilized at 7.7-8.0. Since he received blood transfusion, primary provider can check iron panel nonurgent.  He tolerated the procedure. He was no longer bleeding at discharge date. Urology cleared him for discharge with recommendation that if he has hematuria again he will need urgent cystoscopy otherwise he is to follow with his urologist at [facility] for urolift procedure. He was accepted back to [facility]. Holding aspirin, defer to Urology when this can be restarted safely.    His blood pressures were on the low side of normal. Blood pressure meds were held. I will restart his  carvedilol at lower dose but hold ACEI; his primary provider can follow blood pressures as he also has aortic stenosis/AVR history and keep /70 and below. He can be referred to his cardiologist.    At discharge date, Noris Patel afebrile and hemodynamically stable.  Noris Patel wanted to be discharged today.    Discharge Physical Exam  Vitals signs and nursing note reviewed.   Constitutional:       General: He is not in acute distress.     Appearance: He is well-developed.   HENT:      Head: Normocephalic and atraumatic.      Mouth/Throat:      Pharynx: No oropharyngeal exudate.   Eyes:      General: No scleral icterus.     Pupils: Pupils are equal, round, and reactive to light.   Neck:      Musculoskeletal: Normal range of motion and neck supple.      Thyroid: No thyromegaly.   Cardiovascular:      Rate and Rhythm: Normal rate and regular rhythm.      Heart sounds: Normal heart sounds. No murmur.   Pulmonary:      Effort: Pulmonary effort is normal. No respiratory distress.      Breath sounds: Normal breath sounds. No wheezing.   Abdominal:      General: Bowel sounds are normal. There is no distension.      Palpations: Abdomen is soft.      Tenderness: There is no abdominal tenderness.   Genitourinary:     Penis: Normal.       Comments:  Sevilla catheter  Currently urine clear  Musculoskeletal: Normal range of motion.         General: No tenderness.   Skin:     General: Skin is warm and dry.      Findings: No rash.   Neurological:      Mental Status: He is alert and oriented to person, place, and time.      Cranial Nerves: No cranial nerve deficit.   Psychiatric:      Comments: Cooperative     Imaging  No orders to display             Therefore, he is discharged in guarded and stable condition to skilled nursing facility.    The patient met 2-midnight criteria for an inpatient stay at the time of discharge.      FOLLOW UP ITEMS POST DISCHARGE      DISCHARGE DIAGNOSES  Active Problems:    Hematuria POA: Yes     Acute blood loss anemia POA: Yes    Essential hypertension POA: Yes      FOLLOW UP  Follow up Bradly Sotomayor M.D. or attending at [facility] upon receipt. CHeck blood pressures at facility. Currently low normal blood pressure so I lowered BB. If BP>130/70, then uptitrate BB and restart ACEI. FOllow up to his cardiolist in 1-2 weeks.  Follow up with [facility] Urologist for Uroloft procedure in 1 week. Appointment MUST be made or confirmed.  Follow up with Dr. Boyle, Urology for postopvisit in 1 week or PRN  Return to ER in the event of new or worsening symptoms. Please note importance of compliance and the patient has agreed to proceed with all medical recommendations and follow up plan indicated above. All medications come with benefits and risks. Risks may include permanent injury or death and these risks can be minimized with close reassessment and monitoring. Please make it to your scheduled follow ups with Bradly Sotomayor M.D., and/or specialists clinic.  Any bloody urine please contact Dr. Floyd or Urologist immediately.  No aspirin or NSAIDs or any blood thinners, must be cleared by Urology    MEDICATIONS ON DISCHARGE     Medication List      START taking these medications      Instructions   acetaminophen 325 MG Tabs  Commonly known as:  TYLENOL   Take 2 Tabs by mouth every 6 hours as needed (Mild Pain; (Pain scale 1-3); Temp greater than 100.5 F).  Dose:  650 mg     famotidine 20 MG Tabs  Commonly known as:  PEPCID   Take 1 Tab by mouth 2 Times a Day.  Dose:  20 mg     finasteride 5 MG Tabs  Start taking on:  March 14, 2020  Commonly known as:  PROSCAR   Take 1 Tab by mouth every day.  Dose:  5 mg     ondansetron 4 MG Tbdp  Commonly known as:  ZOFRAN ODT   Take 1 Tab by mouth every four hours as needed for Nausea (give PO if IV route is unavailable.).  Dose:  4 mg     polyethylene glycol/lytes Pack  Commonly known as:  MIRALAX   Take  by mouth 1 time daily as needed (if sennosides and docusate ineffective after 24  hours).     senna-docusate 8.6-50 MG Tabs  Commonly known as:  PERICOLACE or SENOKOT S   Take 2 Tabs by mouth 2 Times a Day.  Dose:  2 Tab        CHANGE how you take these medications      Instructions   carvedilol 3.125 MG Tabs  What changed:    · medication strength  · how much to take  Commonly known as:  COREG   Take 2 Tabs by mouth 2 times a day, with meals.  Dose:  6.25 mg        CONTINUE taking these medications      Instructions   pravastatin 10 MG Tabs  Commonly known as:  PRAVACHOL   Take 1 Tab by mouth every bedtime.  Dose:  10 mg     terazosin 1 MG Caps  Commonly known as:  HYTRIN   Take 1 Cap by mouth every bedtime.  Dose:  1 mg        STOP taking these medications    amLODIPine 5 MG Tabs  Commonly known as:  NORVASC     aspirin EC 81 MG Tbec  Commonly known as:  ECOTRIN     lisinopril 40 MG tablet  Commonly known as:  PRINIVIL     potassium chloride SA 10 MEQ Tbcr  Commonly known as:  K-DUR            Allergies  Allergies   Allergen Reactions   • Food Color Red [Fd&C Red #40]      dizziness   • Food Color Yellow [Fd&C Yellow #5 [place]]    • Influenza Vaccines      Intolerable pain     • Other Food      Egg based vaccines, peanuts, tree nuts, sunflower seeds       DIET  Orders Placed This Encounter   Procedures   • Diet Order Regular     Standing Status:   Standing     Number of Occurrences:   1     Order Specific Question:   Diet:     Answer:   Regular [1]       ACTIVITY  As per SNF    LINES, DRAINS, AND WOUNDS  This is an automated list. Peripheral IVs will be removed prior to discharge.     Continuous Bladder Irrigation Triple-lumen 20 Fr (Active)   Site Assessment Skin intact;Clean 3/13/2020  8:28 AM   Collection Container Standard drainage bag 3/13/2020  8:28 AM   Securement Method Securing device (Describe) 3/13/2020  8:28 AM   CBI Irrigation Intake (mL) 0 mL 3/13/2020  8:28 AM   CBI [place] Output (mL) 750 mL 3/12/2020  6:00 PM   CBI Net Output (mL) 450 mL 3/11/2020  8:30 AM                 Continuous Bladder Irrigation Triple-lumen 20 Fr (Active)   Site Assessment Skin intact;Clean 3/13/2020  8:28 AM   Collection Container Standard drainage bag 3/13/2020  8:28 AM   Securement Method Securing device (Describe) 3/13/2020  8:28 AM   CBI Irrigation Intake (mL) 0 mL 3/13/2020  8:28 AM   CBI [place] Output (mL) 750 mL 3/12/2020  6:00 PM   CBI Net Output (mL) 450 mL 3/11/2020  8:30 AM        MENTAL STATUS ON TRANSFER  Level of Consciousness: Alert  Orientation : Oriented x 4  Speech: Speech Clear    CONSULTATIONS  Urology    PROCEDURES  No results found.      LABORATORY  Lab Results   Component Value Date    SODIUM 141 03/11/2020    POTASSIUM 4.1 03/11/2020    CHLORIDE 113 (H) 03/11/2020    CO2 23 03/11/2020    GLUCOSE 94 03/11/2020    BUN 14 03/11/2020    CREATININE 0.83 03/11/2020        Lab Results   Component Value Date    WBC 6.2 03/13/2020    HEMOGLOBIN 8.0 (L) 03/13/2020    HEMATOCRIT 24.4 (L) 03/13/2020    PLATELETCT 182 03/13/2020        Total time of the discharge process exceeds 40 minutes.

## 2020-03-13 NOTE — PROGRESS NOTES
Discharge instructions given to patient at bedside. Pt is AOx4, and verbalizes understanding and states plans for follow up. New and/or home medications reviewed, patient going to [facility], report given to Miriam by Gabriel Beck RN. Telemetry monitor, ID band, and IV removed. Sevilla left in place for o/p urology follow-up. All belongings accounted for, all questions answered at this time. Pt was taken by wheelchair to medical transport vehicle.

## 2020-04-22 ENCOUNTER — HOSPITAL ENCOUNTER (OUTPATIENT)
Dept: RADIOLOGY | Facility: MEDICAL CENTER | Age: 85
End: 2020-04-22
Payer: MEDICARE

## (undated) DEVICE — MASK, LARYNGEAL AIRWAY #4

## (undated) DEVICE — GOWN SURGEONS X-LARGE - DISP. (30/CA)

## (undated) DEVICE — GOWN SURGICAL X-LARGE ULTRA - FILM-REINFORCED (20/CA)

## (undated) DEVICE — TUBING CLEARLINK DUO-VENT - C-FLO (48EA/CA)

## (undated) DEVICE — SET LEADWIRE 5 LEAD BEDSIDE DISPOSABLE ECG (1SET OF 5/EA)

## (undated) DEVICE — TOWELS CLOTH SURGICAL - (4/PK 20PK/CA)

## (undated) DEVICE — WATER IRRIGATION STERILE 1000ML (12EA/CA)

## (undated) DEVICE — JELLY SURGILUBE STERILE TUBE 4.25 OZ (1/EA)

## (undated) DEVICE — GLOVE BIOGEL PI INDICATOR SZ 7.5 SURGICAL PF LF -(50/BX 4BX/CA)

## (undated) DEVICE — NEPTUNE 4 PORT MANIFOLD - (20/PK)

## (undated) DEVICE — EVACUATOR BLADDER ELLIK - (10/BX)

## (undated) DEVICE — LACTATED RINGERS INJ 1000 ML - (14EA/CA 60CA/PF)

## (undated) DEVICE — SET EXTENSION WITH 2 PORTS (48EA/CA) ***PART #2C8610 IS A SUBSTITUTE*****

## (undated) DEVICE — HEAD HOLDER JUNIOR/ADULT

## (undated) DEVICE — CATHETER URETHRAL FOLEY SILICONE 20 FR 30 ML 3-WAY

## (undated) DEVICE — MASK ANESTHESIA ADULT  - (100/CA)

## (undated) DEVICE — PACK SINGLE BASIN - (6/CA)

## (undated) DEVICE — CANISTER SUCTION 3000ML MECHANICAL FILTER AUTO SHUTOFF MEDI-VAC NONSTERILE LF DISP  (40EA/CA)

## (undated) DEVICE — SUCTION INSTRUMENT YANKAUER BULBOUS TIP W/O VENT (50EA/CA)

## (undated) DEVICE — GLOVE BIOGEL SZ 7 SURGICAL PF LTX - (50PR/BX 4BX/CA)

## (undated) DEVICE — GLOVE COTTON STERILE (50/CA)

## (undated) DEVICE — PROTECTOR ULNA NERVE - (36PR/CA)

## (undated) DEVICE — ELECTRODE 850 FOAM ADHESIVE - HYDROGEL RADIOTRNSPRNT (50/PK)

## (undated) DEVICE — WATER IRRIG. STER 3000 ML - (4/CA)

## (undated) DEVICE — CONNECTOR HOSE NEPTUNE FOR CYSTO ROOM

## (undated) DEVICE — SYRINGE TOOMEY (50EA/CA)

## (undated) DEVICE — GOWN WARMING STANDARD FLEX - (30/CA)

## (undated) DEVICE — CORD ELECTROSURG. TUR DISP (50EA/CA)

## (undated) DEVICE — TUBE CONNECT SUCTION CLEAR 120 X 1/4" (50EA/CA)"

## (undated) DEVICE — BAG URODRAIN WITH TUBING - (20/CA)

## (undated) DEVICE — PACK CYSTO III (4EA/CA)

## (undated) DEVICE — KIT ROOM DECONTAMINATION

## (undated) DEVICE — COVER FOOT UNIVERSAL DISP. - (25EA/CA)

## (undated) DEVICE — SET IRRIGATION CYSTOSCOPY Y-TYPE L81 IN (20EA/CA)

## (undated) DEVICE — SPONGE GAUZESTER 4 X 4 4PLY - (128PK/CA)

## (undated) DEVICE — SENSOR SPO2 NEO LNCS ADHESIVE (20/BX) SEE USER NOTES

## (undated) DEVICE — DETERGENT RENUZYME PLUS 10 OZ PACKET (50/BX)

## (undated) DEVICE — KIT ANESTHESIA W/CIRCUIT & 3/LT BAG W/FILTER (20EA/CA)

## (undated) DEVICE — BAG DRAINAGE URINARY CLOSED 2000ML (20EA/CA)